# Patient Record
Sex: MALE | Race: WHITE | Employment: OTHER
[De-identification: names, ages, dates, MRNs, and addresses within clinical notes are randomized per-mention and may not be internally consistent; named-entity substitution may affect disease eponyms.]

---

## 2017-02-01 ENCOUNTER — OFFICE VISIT (OUTPATIENT)
Dept: FAMILY MEDICINE CLINIC | Facility: CLINIC | Age: 72
End: 2017-02-01

## 2017-02-01 VITALS
RESPIRATION RATE: 20 BRPM | WEIGHT: 232 LBS | HEART RATE: 96 BPM | DIASTOLIC BLOOD PRESSURE: 80 MMHG | SYSTOLIC BLOOD PRESSURE: 138 MMHG | BODY MASS INDEX: 33.29 KG/M2 | TEMPERATURE: 97.4 F | OXYGEN SATURATION: 97 %

## 2017-02-01 DIAGNOSIS — E11.9 TYPE 2 DIABETES MELLITUS WITHOUT COMPLICATION, WITHOUT LONG-TERM CURRENT USE OF INSULIN (HCC): Primary | ICD-10-CM

## 2017-02-01 PROCEDURE — G8427 DOCREV CUR MEDS BY ELIG CLIN: HCPCS | Performed by: NURSE PRACTITIONER

## 2017-02-01 PROCEDURE — G8419 CALC BMI OUT NRM PARAM NOF/U: HCPCS | Performed by: NURSE PRACTITIONER

## 2017-02-01 PROCEDURE — 1036F TOBACCO NON-USER: CPT | Performed by: NURSE PRACTITIONER

## 2017-02-01 PROCEDURE — G8484 FLU IMMUNIZE NO ADMIN: HCPCS | Performed by: NURSE PRACTITIONER

## 2017-02-01 PROCEDURE — 99213 OFFICE O/P EST LOW 20 MIN: CPT | Performed by: NURSE PRACTITIONER

## 2017-02-01 PROCEDURE — 3017F COLORECTAL CA SCREEN DOC REV: CPT | Performed by: NURSE PRACTITIONER

## 2017-02-01 PROCEDURE — 1123F ACP DISCUSS/DSCN MKR DOCD: CPT | Performed by: NURSE PRACTITIONER

## 2017-02-01 PROCEDURE — 3044F HG A1C LEVEL LT 7.0%: CPT | Performed by: NURSE PRACTITIONER

## 2017-02-01 PROCEDURE — 4040F PNEUMOC VAC/ADMIN/RCVD: CPT | Performed by: NURSE PRACTITIONER

## 2017-02-01 ASSESSMENT — PATIENT HEALTH QUESTIONNAIRE - PHQ9
SUM OF ALL RESPONSES TO PHQ QUESTIONS 1-9: 0
SUM OF ALL RESPONSES TO PHQ9 QUESTIONS 1 & 2: 0
2. FEELING DOWN, DEPRESSED OR HOPELESS: 0
1. LITTLE INTEREST OR PLEASURE IN DOING THINGS: 0

## 2017-02-01 ASSESSMENT — ENCOUNTER SYMPTOMS
NAUSEA: 0
SINUS PRESSURE: 0
VISUAL CHANGE: 0
WHEEZING: 0
CONSTIPATION: 0
ABDOMINAL DISTENTION: 0
EYE ITCHING: 0
SHORTNESS OF BREATH: 0
EYE PAIN: 0
ABDOMINAL PAIN: 0
COLOR CHANGE: 0
DIARRHEA: 0
CHEST TIGHTNESS: 0
BLOOD IN STOOL: 0
COUGH: 0
SORE THROAT: 0

## 2017-03-24 RX ORDER — METOPROLOL SUCCINATE 50 MG/1
50 TABLET, EXTENDED RELEASE ORAL DAILY
Qty: 90 TABLET | Refills: 1 | Status: SHIPPED | OUTPATIENT
Start: 2017-03-24 | End: 2017-09-28 | Stop reason: SDUPTHER

## 2017-04-17 RX ORDER — ALLOPURINOL 300 MG/1
TABLET ORAL
Qty: 90 TABLET | Refills: 0 | Status: SHIPPED | OUTPATIENT
Start: 2017-04-17 | End: 2017-06-27 | Stop reason: SDUPTHER

## 2017-04-17 RX ORDER — ALLOPURINOL 300 MG/1
TABLET ORAL
Qty: 90 TABLET | Refills: 0 | Status: SHIPPED | OUTPATIENT
Start: 2017-04-17 | End: 2017-04-17 | Stop reason: SDUPTHER

## 2017-04-26 ENCOUNTER — NURSE ONLY (OUTPATIENT)
Dept: FAMILY MEDICINE CLINIC | Age: 72
End: 2017-04-26
Payer: MEDICARE

## 2017-04-26 ENCOUNTER — HOSPITAL ENCOUNTER (OUTPATIENT)
Age: 72
Setting detail: SPECIMEN
Discharge: HOME | End: 2017-04-26
Payer: MEDICARE

## 2017-04-26 DIAGNOSIS — Z12.11 SCREENING FOR COLON CANCER: ICD-10-CM

## 2017-04-26 DIAGNOSIS — E11.9 TYPE 2 DIABETES MELLITUS WITHOUT COMPLICATION, UNSPECIFIED LONG TERM INSULIN USE STATUS: Primary | ICD-10-CM

## 2017-04-26 DIAGNOSIS — E78.00 ELEVATED CHOLESTEROL: ICD-10-CM

## 2017-04-26 DIAGNOSIS — M1A.9XX0 CHRONIC GOUT WITHOUT TOPHUS, UNSPECIFIED CAUSE, UNSPECIFIED SITE: ICD-10-CM

## 2017-04-26 LAB — HBA1C MFR BLD: 6.8 %

## 2017-04-26 PROCEDURE — 83036 HEMOGLOBIN GLYCOSYLATED A1C: CPT | Performed by: NURSE PRACTITIONER

## 2017-04-27 LAB
CREATININE URINE: 159 MG/DL (ref 39–259)
MICROALBUMIN/CREAT 24H UR: 87 MG/L
MICROALBUMIN/CREAT UR-RTO: 55 MCG/MG CREAT

## 2017-06-27 RX ORDER — ALLOPURINOL 300 MG/1
TABLET ORAL
Qty: 90 TABLET | Refills: 0 | Status: SHIPPED | OUTPATIENT
Start: 2017-06-27 | End: 2017-10-11 | Stop reason: SDUPTHER

## 2017-06-30 RX ORDER — ROSUVASTATIN CALCIUM 5 MG/1
5 TABLET, COATED ORAL
Qty: 30 TABLET | Refills: 3 | Status: SHIPPED | OUTPATIENT
Start: 2017-06-30 | End: 2017-07-05 | Stop reason: SDUPTHER

## 2017-07-05 RX ORDER — ROSUVASTATIN CALCIUM 5 MG/1
5 TABLET, COATED ORAL
Qty: 30 TABLET | Refills: 3 | OUTPATIENT
Start: 2017-07-05 | End: 2018-08-20 | Stop reason: SDUPTHER

## 2017-07-21 ENCOUNTER — HOSPITAL ENCOUNTER (OUTPATIENT)
Age: 72
Setting detail: SPECIMEN
Discharge: HOME | End: 2017-07-21
Payer: MEDICARE

## 2017-07-21 DIAGNOSIS — E11.9 TYPE 2 DIABETES MELLITUS WITHOUT COMPLICATION, UNSPECIFIED LONG TERM INSULIN USE STATUS: ICD-10-CM

## 2017-07-21 DIAGNOSIS — M1A.9XX0 CHRONIC GOUT WITHOUT TOPHUS, UNSPECIFIED CAUSE, UNSPECIFIED SITE: ICD-10-CM

## 2017-07-21 DIAGNOSIS — E78.00 ELEVATED CHOLESTEROL: ICD-10-CM

## 2017-07-21 LAB
ALT SERPL-CCNC: 25 U/L (ref 5–41)
AST SERPL-CCNC: 21 U/L
CHOLESTEROL/HDL RATIO: 3.8
CHOLESTEROL: 119 MG/DL
HDLC SERPL-MCNC: 31 MG/DL
LDL CHOLESTEROL: 56 MG/DL (ref 0–130)
TRIGL SERPL-MCNC: 161 MG/DL
URIC ACID: 6.1 MG/DL (ref 3.4–7)
VLDLC SERPL CALC-MCNC: ABNORMAL MG/DL (ref 1–30)

## 2017-07-24 DIAGNOSIS — E11.9 TYPE 2 DIABETES MELLITUS WITHOUT COMPLICATION, WITHOUT LONG-TERM CURRENT USE OF INSULIN (HCC): ICD-10-CM

## 2017-07-31 ENCOUNTER — HOSPITAL ENCOUNTER (OUTPATIENT)
Age: 72
Setting detail: SPECIMEN
Discharge: HOME | End: 2017-07-31
Payer: MEDICARE

## 2017-07-31 LAB
BUN BLDV-MCNC: 21 MG/DL (ref 8–23)
CHOLESTEROL/HDL RATIO: 4.3
CHOLESTEROL: 130 MG/DL
CREAT SERPL-MCNC: 1.11 MG/DL (ref 0.7–1.2)
ESTIMATED AVERAGE GLUCOSE: 154 MG/DL
GFR AFRICAN AMERICAN: >60 ML/MIN
GFR NON-AFRICAN AMERICAN: >60 ML/MIN
GFR SERPL CREATININE-BSD FRML MDRD: NORMAL ML/MIN/{1.73_M2}
GFR SERPL CREATININE-BSD FRML MDRD: NORMAL ML/MIN/{1.73_M2}
HBA1C MFR BLD: 7 % (ref 4–6)
HCT VFR BLD CALC: 42 % (ref 41–53)
HDLC SERPL-MCNC: 30 MG/DL
HEMOGLOBIN: 14.3 G/DL (ref 13.5–17.5)
LDL CHOLESTEROL: 57 MG/DL (ref 0–130)
MCH RBC QN AUTO: 31.5 PG (ref 26–34)
MCHC RBC AUTO-ENTMCNC: 34.1 G/DL (ref 31–37)
MCV RBC AUTO: 92.3 FL (ref 80–100)
PDW BLD-RTO: 14.7 % (ref 12.5–15.4)
PLATELET # BLD: 261 K/UL (ref 140–450)
PMV BLD AUTO: 8.9 FL (ref 6–12)
RBC # BLD: 4.55 M/UL (ref 4.5–5.9)
TRIGL SERPL-MCNC: 216 MG/DL
VLDLC SERPL CALC-MCNC: ABNORMAL MG/DL (ref 1–30)
WBC # BLD: 10.4 K/UL (ref 3.5–11)

## 2017-08-14 ENCOUNTER — HOSPITAL ENCOUNTER (OUTPATIENT)
Dept: MRI IMAGING | Age: 72
Discharge: HOME | End: 2017-08-14
Payer: MEDICARE

## 2017-08-14 ENCOUNTER — HOSPITAL ENCOUNTER (OUTPATIENT)
Dept: CT IMAGING | Age: 72
Discharge: HOME | End: 2017-08-14
Payer: MEDICARE

## 2017-08-14 DIAGNOSIS — I63.89 OTHER CEREBRAL INFARCTION (HCC): ICD-10-CM

## 2017-08-14 DIAGNOSIS — R44.1 VISUAL HALLUCINATIONS: ICD-10-CM

## 2017-08-14 PROCEDURE — A9579 GAD-BASE MR CONTRAST NOS,1ML: HCPCS | Performed by: OPHTHALMOLOGY

## 2017-08-14 PROCEDURE — 6360000004 HC RX CONTRAST MEDICATION: Performed by: OPHTHALMOLOGY

## 2017-08-14 PROCEDURE — 2580000003 HC RX 258: Performed by: OPHTHALMOLOGY

## 2017-08-14 PROCEDURE — 70498 CT ANGIOGRAPHY NECK: CPT

## 2017-08-14 PROCEDURE — 70496 CT ANGIOGRAPHY HEAD: CPT

## 2017-08-14 PROCEDURE — 70553 MRI BRAIN STEM W/O & W/DYE: CPT

## 2017-08-14 RX ORDER — 0.9 % SODIUM CHLORIDE 0.9 %
100 INTRAVENOUS SOLUTION INTRAVENOUS ONCE
Status: COMPLETED | OUTPATIENT
Start: 2017-08-14 | End: 2017-08-14

## 2017-08-14 RX ORDER — SODIUM CHLORIDE 0.9 % (FLUSH) 0.9 %
10 SYRINGE (ML) INJECTION PRN
Status: DISCONTINUED | OUTPATIENT
Start: 2017-08-14 | End: 2017-08-17 | Stop reason: HOSPADM

## 2017-08-14 RX ADMIN — GADOPENTETATE DIMEGLUMINE 20 ML: 469.01 INJECTION INTRAVENOUS at 16:52

## 2017-08-14 RX ADMIN — IOVERSOL 100 ML: 741 INJECTION INTRA-ARTERIAL; INTRAVENOUS at 15:45

## 2017-08-14 RX ADMIN — Medication 10 ML: at 15:45

## 2017-08-14 RX ADMIN — SODIUM CHLORIDE 100 ML: 9 INJECTION, SOLUTION INTRAVENOUS at 15:45

## 2017-09-11 RX ORDER — VALSARTAN AND HYDROCHLOROTHIAZIDE 80; 12.5 MG/1; MG/1
1 TABLET, FILM COATED ORAL DAILY
Qty: 90 TABLET | Refills: 0 | Status: SHIPPED | OUTPATIENT
Start: 2017-09-11 | End: 2017-10-11 | Stop reason: SDUPTHER

## 2017-09-28 RX ORDER — METOPROLOL SUCCINATE 50 MG/1
50 TABLET, EXTENDED RELEASE ORAL DAILY
Qty: 30 TABLET | Refills: 0 | Status: SHIPPED | OUTPATIENT
Start: 2017-09-28 | End: 2017-10-11 | Stop reason: SDUPTHER

## 2017-10-04 ENCOUNTER — HOSPITAL ENCOUNTER (OUTPATIENT)
Age: 72
Setting detail: SPECIMEN
Discharge: HOME | End: 2017-10-04
Payer: MEDICARE

## 2017-10-04 LAB
ABSOLUTE EOS #: 0.3 K/UL (ref 0–0.4)
ABSOLUTE LYMPH #: 2.6 K/UL (ref 1–4.8)
ABSOLUTE MONO #: 1.1 K/UL (ref 0.1–1.2)
ALBUMIN SERPL-MCNC: 4.1 G/DL (ref 3.5–5.2)
ALBUMIN/GLOBULIN RATIO: 1.5 (ref 1–2.5)
ALP BLD-CCNC: 44 U/L (ref 40–129)
ALT SERPL-CCNC: 32 U/L (ref 5–41)
AMYLASE: 87 U/L (ref 28–100)
AST SERPL-CCNC: 23 U/L
BASOPHILS # BLD: 0 %
BASOPHILS ABSOLUTE: 0 K/UL (ref 0–0.2)
BILIRUB SERPL-MCNC: 0.43 MG/DL (ref 0.3–1.2)
BILIRUBIN DIRECT: 0.12 MG/DL
BILIRUBIN, INDIRECT: 0.31 MG/DL (ref 0–1)
C-REACTIVE PROTEIN: 0.8 MG/L (ref 0–5)
DIFFERENTIAL TYPE: NORMAL
EOSINOPHILS RELATIVE PERCENT: 3 %
GLOBULIN: NORMAL G/DL (ref 1.5–3.8)
HCT VFR BLD CALC: 42.4 % (ref 41–53)
HEMOGLOBIN: 14.4 G/DL (ref 13.5–17.5)
LIPASE: 38 U/L (ref 13–60)
LYMPHOCYTES # BLD: 27 %
MCH RBC QN AUTO: 31.3 PG (ref 26–34)
MCHC RBC AUTO-ENTMCNC: 33.9 G/DL (ref 31–37)
MCV RBC AUTO: 92.4 FL (ref 80–100)
MONOCYTES # BLD: 11 %
PDW BLD-RTO: 14.8 % (ref 12.5–15.4)
PLATELET # BLD: 273 K/UL (ref 140–450)
PLATELET ESTIMATE: NORMAL
PMV BLD AUTO: 8.7 FL (ref 6–12)
RBC # BLD: 4.59 M/UL (ref 4.5–5.9)
RBC # BLD: NORMAL 10*6/UL
SEG NEUTROPHILS: 59 %
SEGMENTED NEUTROPHILS ABSOLUTE COUNT: 5.7 K/UL (ref 1.8–7.7)
TOTAL PROTEIN: 6.8 G/DL (ref 6.4–8.3)
WBC # BLD: 9.7 K/UL (ref 3.5–11)
WBC # BLD: NORMAL 10*3/UL

## 2017-10-05 LAB — SEDIMENTATION RATE, ERYTHROCYTE: 6 MM (ref 0–10)

## 2017-10-11 ENCOUNTER — OFFICE VISIT (OUTPATIENT)
Dept: FAMILY MEDICINE CLINIC | Age: 72
End: 2017-10-11
Payer: MEDICARE

## 2017-10-11 VITALS
HEIGHT: 70 IN | HEART RATE: 88 BPM | DIASTOLIC BLOOD PRESSURE: 78 MMHG | BODY MASS INDEX: 33.84 KG/M2 | WEIGHT: 236.4 LBS | TEMPERATURE: 97.7 F | SYSTOLIC BLOOD PRESSURE: 134 MMHG | OXYGEN SATURATION: 98 % | RESPIRATION RATE: 18 BRPM

## 2017-10-11 DIAGNOSIS — E78.2 MIXED HYPERLIPIDEMIA: ICD-10-CM

## 2017-10-11 DIAGNOSIS — E11.9 TYPE 2 DIABETES MELLITUS WITHOUT COMPLICATION, WITHOUT LONG-TERM CURRENT USE OF INSULIN (HCC): Primary | ICD-10-CM

## 2017-10-11 DIAGNOSIS — E11.9 TYPE 2 DIABETES MELLITUS WITHOUT COMPLICATION, WITHOUT LONG-TERM CURRENT USE OF INSULIN (HCC): ICD-10-CM

## 2017-10-11 DIAGNOSIS — R56.9 SEIZURES (HCC): ICD-10-CM

## 2017-10-11 PROCEDURE — 3017F COLORECTAL CA SCREEN DOC REV: CPT | Performed by: NURSE PRACTITIONER

## 2017-10-11 PROCEDURE — 3046F HEMOGLOBIN A1C LEVEL >9.0%: CPT | Performed by: NURSE PRACTITIONER

## 2017-10-11 PROCEDURE — 4040F PNEUMOC VAC/ADMIN/RCVD: CPT | Performed by: NURSE PRACTITIONER

## 2017-10-11 PROCEDURE — G8484 FLU IMMUNIZE NO ADMIN: HCPCS | Performed by: NURSE PRACTITIONER

## 2017-10-11 PROCEDURE — G8427 DOCREV CUR MEDS BY ELIG CLIN: HCPCS | Performed by: NURSE PRACTITIONER

## 2017-10-11 PROCEDURE — 1036F TOBACCO NON-USER: CPT | Performed by: NURSE PRACTITIONER

## 2017-10-11 PROCEDURE — G8598 ASA/ANTIPLAT THER USED: HCPCS | Performed by: NURSE PRACTITIONER

## 2017-10-11 PROCEDURE — 99214 OFFICE O/P EST MOD 30 MIN: CPT | Performed by: NURSE PRACTITIONER

## 2017-10-11 PROCEDURE — G8417 CALC BMI ABV UP PARAM F/U: HCPCS | Performed by: NURSE PRACTITIONER

## 2017-10-11 PROCEDURE — 1123F ACP DISCUSS/DSCN MKR DOCD: CPT | Performed by: NURSE PRACTITIONER

## 2017-10-11 RX ORDER — VALSARTAN AND HYDROCHLOROTHIAZIDE 80; 12.5 MG/1; MG/1
1 TABLET, FILM COATED ORAL DAILY
Qty: 90 TABLET | Refills: 0 | Status: SHIPPED | OUTPATIENT
Start: 2017-10-11 | End: 2017-12-12 | Stop reason: SDUPTHER

## 2017-10-11 RX ORDER — GLUCOSAMINE HCL/CHONDROITIN SU 500-400 MG
1 CAPSULE ORAL DAILY
Qty: 50 STRIP | Refills: 5 | Status: SHIPPED | OUTPATIENT
Start: 2017-10-11 | End: 2017-10-12 | Stop reason: CLARIF

## 2017-10-11 RX ORDER — ALLOPURINOL 300 MG/1
TABLET ORAL
Qty: 90 TABLET | Refills: 1 | Status: SHIPPED | OUTPATIENT
Start: 2017-10-11 | End: 2018-04-16 | Stop reason: SDUPTHER

## 2017-10-11 RX ORDER — DIVALPROEX SODIUM 500 MG/1
500 TABLET, DELAYED RELEASE ORAL 3 TIMES DAILY
Status: ON HOLD | COMMUNITY
End: 2018-01-26 | Stop reason: ALTCHOICE

## 2017-10-11 RX ORDER — METOPROLOL SUCCINATE 50 MG/1
50 TABLET, EXTENDED RELEASE ORAL DAILY
Qty: 90 TABLET | Refills: 1 | Status: SHIPPED | OUTPATIENT
Start: 2017-10-11 | End: 2017-10-30 | Stop reason: SDUPTHER

## 2017-10-11 RX ORDER — LANCETS 28 GAUGE
1 EACH MISCELLANEOUS DAILY
Qty: 100 EACH | Refills: 3 | Status: SHIPPED | OUTPATIENT
Start: 2017-10-11 | End: 2017-10-12 | Stop reason: CLARIF

## 2017-10-11 NOTE — PROGRESS NOTES
Visit Information    Have you changed or started any medications since your last visit including any over-the-counter medicines, vitamins, or herbal medicines? no   Are you having any side effects from any of your medications? -  no  Have you stopped taking any of your medications? Is so, why? -  no    Have you seen any other physician or provider since your last visit? No  Have you had any other diagnostic tests since your last visit? No  Have you been seen in the emergency room and/or had an admission to a hospital since we last saw you? No  Have you had your routine dental cleaning in the past 6 months? yes     Have you activated your [employer] account? If not, what are your barriers?  No: declined      Patient Care Team:  Rosamaria Traylor MD as PCP - General  Sai Orlando CNP as PCP - [facility] Attributed Provider    Medical History Review      Health Maintenance   Topic Date Due    Hepatitis C screen  1945    Diabetic foot exam  09/21/1956    Colon cancer screen colonoscopy  09/21/1996    Zostavax vaccine  09/21/2006    Flu vaccine (1) 09/01/2017    Pneumococcal low/med risk (2 of 2 - PPSV23) 11/01/2017    Diabetic microalbuminuria test  04/26/2018    Diabetic retinal exam  06/05/2018    Diabetic hemoglobin A1C test  07/31/2018    Lipid screen  07/31/2018    DTaP/Tdap/Td vaccine (2 - Td) 10/07/2025

## 2017-10-12 DIAGNOSIS — E11.9 TYPE 2 DIABETES MELLITUS WITHOUT COMPLICATION, UNSPECIFIED LONG TERM INSULIN USE STATUS: Primary | ICD-10-CM

## 2017-10-12 RX ORDER — DIPHENHYDRAMINE HCL 25 MG
1 TABLET ORAL DAILY
Qty: 1 KIT | Refills: 0 | Status: ON HOLD | OUTPATIENT
Start: 2017-10-12 | End: 2018-02-01 | Stop reason: HOSPADM

## 2017-10-12 NOTE — TELEPHONE ENCOUNTER
Freestyle test strips were sent Insurance will not cover. Need to send new script orders entered.  [facility]

## 2017-10-16 ASSESSMENT — ENCOUNTER SYMPTOMS
SHORTNESS OF BREATH: 0
BLOOD IN STOOL: 0
COUGH: 0
WHEEZING: 0
EYE PAIN: 0
NAUSEA: 0
SORE THROAT: 0
SINUS PRESSURE: 0
VISUAL CHANGE: 1
EYE ITCHING: 0
CONSTIPATION: 0
DIARRHEA: 0
COLOR CHANGE: 0
ABDOMINAL DISTENTION: 0
CHEST TIGHTNESS: 0
ABDOMINAL PAIN: 0

## 2017-10-16 NOTE — PROGRESS NOTES
reduction)    PHQ Scores 2/1/2017   PHQ2 Score 0   PHQ9 Score 0     Interpretation of Total Score Depression Severity: 1-4 = Minimal depression, 5-9 = Mild depression, 10-14 = Moderate depression, 15-19 = Moderately severe depression, 20-27 = Severe depression            HPI:     Diabetes   He presents for his follow-up diabetic visit. He has type 2 diabetes mellitus. No MedicAlert identification noted. His disease course has been stable. Hypoglycemia symptoms include seizures (described as flashes in eyes that last a few seconds. ). Pertinent negatives for hypoglycemia include no headaches, nervousness/anxiousness or speech difficulty. Associated symptoms include visual change. Pertinent negatives for diabetes include no chest pain, no polydipsia, no polyphagia, no polyuria and no weakness. There are no hypoglycemic complications. There are no diabetic complications. Risk factors for coronary artery disease include dyslipidemia, hypertension, male sex and diabetes mellitus. Current diabetic treatment includes oral agent (monotherapy). He is compliant with treatment most of the time. His weight is stable. He is following a generally healthy diet. Meal planning includes avoidance of concentrated sweets. He has not had a previous visit with a dietitian. He participates in exercise intermittently. There is no change in his home blood glucose trend. An ACE inhibitor/angiotensin II receptor blocker is being taken. He does not see a podiatrist.Eye exam is not current. Hyperlipidemia   This is a chronic problem. The current episode started more than 1 year ago. The problem is controlled. Recent lipid tests were reviewed and are normal. Exacerbating diseases include diabetes. He has no history of hypothyroidism. Factors aggravating his hyperlipidemia include thiazides and beta blockers. Pertinent negatives include no chest pain, leg pain, myalgias or shortness of breath.  Current antihyperlipidemic treatment includes statins. The current treatment provides moderate improvement of lipids. There are no compliance problems. Risk factors for coronary artery disease include diabetes mellitus, hypertension, male sex and obesity. Seizures   This is a new problem. The current episode started more than 1 month ago. The problem occurs 2 to 4 times per day. The problem has been unchanged. Associated symptoms include a visual change. Pertinent negatives include no abdominal pain, arthralgias, chest pain, congestion, coughing, headaches, myalgias, nausea, rash, sore throat or weakness. The symptoms are aggravated by bending. He has tried rest and lying down for the symptoms. The treatment provided no relief. Seizures consist of eye having a quick fading out of vision and it passes within seconds. States neurologist did EEG which was abnormal and that was how this was determined to be seizures. Hemoglobin A1C (%)   Date Value   07/31/2017 7.0 (H)   04/26/2017 6.8   03/23/2016 5.4             ( goal A1C is < 7)   Microalb/Crt.  Ratio (mcg/mg creat)   Date Value   04/26/2017 55 (H)     LDL Cholesterol (mg/dL)   Date Value   07/31/2017 57   07/21/2017 56   03/23/2016 58       (goal LDL is <100)   AST (U/L)   Date Value   10/04/2017 23     ALT (U/L)   Date Value   10/04/2017 32     BUN (mg/dL)   Date Value   07/31/2017 21     BP Readings from Last 3 Encounters:   10/11/17 134/78   02/01/17 138/80   09/22/16 140/80          (goal 120/80)    Past Medical History:   Diagnosis Date    Edema     Elevated blood pressure reading without diagnosis of hypertension     Hyperlipidemia     Hypertension     Hypertrophy of prostate with urinary obstruction and other lower urinary tract symptoms (LUTS)     Impotence of organic origin     Lumbago     Spasm of muscle     Thoracic or lumbosacral neuritis or radiculitis, unspecified     Type II or unspecified type diabetes mellitus without mention of complication, not stated as uncontrolled rub.    No murmur heard. Pulmonary/Chest: Effort normal and breath sounds normal. No respiratory distress. He has no wheezes. He has no rales. He exhibits no tenderness. Abdominal: Soft. Bowel sounds are normal. He exhibits no distension. There is no splenomegaly or hepatomegaly. There is no tenderness. No hernia. Musculoskeletal: Normal range of motion. He exhibits no edema or tenderness. Lymphadenopathy:     He has no cervical adenopathy. Neurological: He is alert and oriented to person, place, and time. Coordination and gait normal.   Skin: Skin is warm and dry. No rash noted. No erythema. Psychiatric: He has a normal mood and affect. His speech is normal and behavior is normal. Judgment and thought content normal. Cognition and memory are normal.   Nursing note and vitals reviewed. Assessment:      1. Type 2 diabetes mellitus without complication, without long-term current use of insulin ([facility] [address]    2. Mixed hyperlipidemia    3. Seizures ([facility] [address]                     Plan:     Diabetes:  Monitor BS daily or more often if BS's are out of range  Discussed signs and symptoms of hypo/hyperglycemia  Take medications as directed  Read nutrition labels so appropriate carbohydrates can be counted  Lose weight slowly if needed  Monitor BP every couple of days  Exercise for at least 30 minute a day 3-5 times a week  Drink plenty of water (8-8oz glasses daily)  RTO as directed  Hyperlipidemia:  Take medication as directed  May want to take Darrick Red daily to help with good cholesterol  Limit the fat in diet to less than 300mg per day  Most of the fat intake should be from polyunsaturated or monounsaturated fats such as fish, olive oil, avocados, nuts. Exercise regularly at least 3 times a week for 30 minutes  Keep weight down  Do not smoke  RTO as directed  Seizures: Take medication as directed  Do not skip a dose. Get refills before you run out.   Keep seizure medicines in a safe place, away from children. Store medicines in a dry place, in the bottle that they came in. Do not drink alcohol or use illegal drugs as these can cause seizures. Do not drive or do any activity that would be unsafe if seizure occurred. No orders of the defined types were placed in this encounter. Orders Placed This Encounter   Medications    allopurinol (ZYLOPRIM) 300 MG tablet     Sig: TAKE ONE TABLET BY MOUTH ONCE DAILY     Dispense:  90 tablet     Refill:  1    metoprolol succinate (TOPROL XL) 50 MG extended release tablet     Sig: Take 1 tablet by mouth daily     Dispense:  90 tablet     Refill:  1    metFORMIN (GLUCOPHAGE) 500 MG tablet     Sig: Take 1 tablet by mouth daily (with breakfast)     Dispense:  90 tablet     Refill:  1    valsartan-hydrochlorothiazide (DIOVAN HCT) 80-12.5 MG per tablet     Sig: Take 1 tablet by mouth daily     Dispense:  90 tablet     Refill:  0    DISCONTD: Glucose Blood (BLOOD GLUCOSE TEST STRIPS) STRP     Sig: Inject 1 strip into the skin daily     Dispense:  50 strip     Refill:  5     Freestyle Freedom Lite strips        Return in about 6 months (around 4/11/2018) for hypertension, hyperlipidemia. Patient given educational materials - see patient instructions. Discussed use, benefit, and side effects of prescribed medications. All patient questions answered. Pt voiced understanding. Reviewed health maintenance. Instructed to continue current medications, diet and exercise. Patient agreed with treatment plan. Follow up as directed.      Electronically signed by Lindsay Krause CNP on 10/16/2017

## 2017-10-30 DIAGNOSIS — E11.9 TYPE 2 DIABETES MELLITUS WITHOUT COMPLICATION, WITHOUT LONG-TERM CURRENT USE OF INSULIN (HCC): ICD-10-CM

## 2017-10-30 RX ORDER — METOPROLOL SUCCINATE 50 MG/1
50 TABLET, EXTENDED RELEASE ORAL DAILY
Qty: 90 TABLET | Refills: 1 | Status: SHIPPED | OUTPATIENT
Start: 2017-10-30 | End: 2018-04-16 | Stop reason: SDUPTHER

## 2017-12-04 ENCOUNTER — HOSPITAL ENCOUNTER (OUTPATIENT)
Age: 72
Setting detail: SPECIMEN
Discharge: HOME | End: 2017-12-04
Payer: MEDICARE

## 2017-12-04 LAB
ABSOLUTE EOS #: 0.55 K/UL (ref 0–0.44)
ABSOLUTE IMMATURE GRANULOCYTE: 0.05 K/UL (ref 0–0.3)
ABSOLUTE LYMPH #: 3.54 K/UL (ref 1.1–3.7)
ABSOLUTE MONO #: 1.38 K/UL (ref 0.1–1.2)
ALBUMIN SERPL-MCNC: 4.2 G/DL (ref 3.5–5.2)
ALBUMIN/GLOBULIN RATIO: 1.7 (ref 1–2.5)
ALP BLD-CCNC: 38 U/L (ref 40–129)
ALT SERPL-CCNC: 24 U/L (ref 5–41)
AMYLASE: 82 U/L (ref 28–100)
AST SERPL-CCNC: 16 U/L
BASOPHILS # BLD: 0 % (ref 0–2)
BASOPHILS ABSOLUTE: 0.05 K/UL (ref 0–0.2)
BILIRUB SERPL-MCNC: 0.46 MG/DL (ref 0.3–1.2)
BILIRUBIN DIRECT: 0.13 MG/DL
BILIRUBIN, INDIRECT: 0.33 MG/DL (ref 0–1)
DIFFERENTIAL TYPE: ABNORMAL
EOSINOPHILS RELATIVE PERCENT: 5 % (ref 1–4)
GLOBULIN: ABNORMAL G/DL (ref 1.5–3.8)
HCT VFR BLD CALC: 44.5 % (ref 40.7–50.3)
HEMOGLOBIN: 14.7 G/DL (ref 13–17)
IMMATURE GRANULOCYTES: 0 %
LIPASE: 34 U/L (ref 13–60)
LYMPHOCYTES # BLD: 30 % (ref 24–43)
MCH RBC QN AUTO: 31.1 PG (ref 25.2–33.5)
MCHC RBC AUTO-ENTMCNC: 33 G/DL (ref 28.4–34.8)
MCV RBC AUTO: 94.1 FL (ref 82.6–102.9)
MONOCYTES # BLD: 12 % (ref 3–12)
PDW BLD-RTO: 13.4 % (ref 11.8–14.4)
PLATELET # BLD: 268 K/UL (ref 138–453)
PLATELET ESTIMATE: ABNORMAL
PMV BLD AUTO: 10.7 FL (ref 8.1–13.5)
RBC # BLD: 4.73 M/UL (ref 4.21–5.77)
RBC # BLD: ABNORMAL 10*6/UL
SEG NEUTROPHILS: 53 % (ref 36–65)
SEGMENTED NEUTROPHILS ABSOLUTE COUNT: 6.3 K/UL (ref 1.5–8.1)
TOTAL PROTEIN: 6.7 G/DL (ref 6.4–8.3)
WBC # BLD: 11.9 K/UL (ref 3.5–11.3)
WBC # BLD: ABNORMAL 10*3/UL

## 2017-12-06 ENCOUNTER — NURSE ONLY (OUTPATIENT)
Dept: FAMILY MEDICINE CLINIC | Age: 72
End: 2017-12-06
Payer: MEDICARE

## 2017-12-06 VITALS
WEIGHT: 236.4 LBS | HEIGHT: 70 IN | SYSTOLIC BLOOD PRESSURE: 134 MMHG | BODY MASS INDEX: 33.84 KG/M2 | DIASTOLIC BLOOD PRESSURE: 78 MMHG | RESPIRATION RATE: 18 BRPM

## 2017-12-06 DIAGNOSIS — Z12.11 COLON CANCER SCREENING: Primary | ICD-10-CM

## 2017-12-06 DIAGNOSIS — R19.5 POSITIVE FIT (FECAL IMMUNOCHEMICAL TEST): Primary | ICD-10-CM

## 2017-12-06 LAB
CONTROL: PRESENT
HEMOCCULT STL QL: NORMAL

## 2017-12-06 PROCEDURE — 82274 ASSAY TEST FOR BLOOD FECAL: CPT | Performed by: NURSE PRACTITIONER

## 2017-12-12 RX ORDER — VALSARTAN AND HYDROCHLOROTHIAZIDE 80; 12.5 MG/1; MG/1
1 TABLET, FILM COATED ORAL DAILY
Qty: 90 TABLET | Refills: 1 | Status: SHIPPED | OUTPATIENT
Start: 2017-12-12 | End: 2018-05-30 | Stop reason: SDUPTHER

## 2017-12-12 NOTE — TELEPHONE ENCOUNTER
Last seen 10/2017    Health Maintenance   Topic Date Due    Hepatitis C screen  1945    Diabetic foot exam  09/21/1956    Zostavax vaccine  09/21/2006    Flu vaccine (1) 09/01/2017    Pneumococcal low/med risk (2 of 2 - PPSV23) 11/01/2017    Diabetic microalbuminuria test  04/26/2018    Diabetic retinal exam  06/05/2018    Diabetic hemoglobin A1C test  07/31/2018    Lipid screen  07/31/2018    Colon Cancer Screen FIT/FOBT  12/06/2018    DTaP/Tdap/Td vaccine (2 - Td) 10/07/2025             (applicable per patient's age: Cancer Screenings, Depression Screening, Fall Risk Screening, Immunizations)    Hemoglobin A1C (%)   Date Value   07/31/2017 7.0 (H)   04/26/2017 6.8   03/23/2016 5.4     Microalb/Crt. Ratio (mcg/mg creat)   Date Value   04/26/2017 55 (H)     LDL Cholesterol (mg/dL)   Date Value   07/31/2017 57     AST (U/L)   Date Value   12/04/2017 16     ALT (U/L)   Date Value   12/04/2017 24     BUN (mg/dL)   Date Value   07/31/2017 21      (goal A1C is < 7)   (goal LDL is <100) need 30-50% reduction from baseline     BP Readings from Last 3 Encounters:   12/06/17 134/78   10/11/17 134/78   02/01/17 138/80    (goal /80)      All Future Testing planned in [facility]:      Next Visit Date:  No future appointments.          Patient Active Problem List:     Spasm of muscle     Lumbago     Elevated blood pressure reading without diagnosis of hypertension     Hypertrophy of prostate with urinary obstruction and other lower urinary tract symptoms (LUTS)     Thoracic or lumbosacral neuritis or radiculitis, unspecified     Impotence of organic origin     Hyperlipidemia     Edema     Hypertension     Type 2 diabetes mellitus without complication (HCC)     Chronic gout without tophus

## 2018-01-22 ENCOUNTER — HOSPITAL ENCOUNTER (OUTPATIENT)
Age: 73
Setting detail: OUTPATIENT SURGERY
Discharge: HOME | End: 2018-01-22
Attending: SURGERY | Admitting: SURGERY
Payer: MEDICARE

## 2018-01-22 VITALS
DIASTOLIC BLOOD PRESSURE: 77 MMHG | RESPIRATION RATE: 14 BRPM | TEMPERATURE: 97.5 F | SYSTOLIC BLOOD PRESSURE: 122 MMHG | HEIGHT: 70 IN | BODY MASS INDEX: 33.21 KG/M2 | HEART RATE: 88 BPM | WEIGHT: 232 LBS | OXYGEN SATURATION: 98 %

## 2018-01-22 LAB — GLUCOSE BLD-MCNC: 159 MG/DL (ref 75–110)

## 2018-01-22 PROCEDURE — 6360000002 HC RX W HCPCS: Performed by: SURGERY

## 2018-01-22 PROCEDURE — 2580000003 HC RX 258: Performed by: SURGERY

## 2018-01-22 PROCEDURE — 7100000011 HC PHASE II RECOVERY - ADDTL 15 MIN: Performed by: SURGERY

## 2018-01-22 PROCEDURE — 99152 MOD SED SAME PHYS/QHP 5/>YRS: CPT | Performed by: SURGERY

## 2018-01-22 PROCEDURE — 99153 MOD SED SAME PHYS/QHP EA: CPT | Performed by: SURGERY

## 2018-01-22 PROCEDURE — C1773 RET DEV, INSERTABLE: HCPCS | Performed by: SURGERY

## 2018-01-22 PROCEDURE — 3609010600 HC COLONOSCOPY POLYPECTOMY SNARE/COLD BIOPSY: Performed by: SURGERY

## 2018-01-22 PROCEDURE — 88305 TISSUE EXAM BY PATHOLOGIST: CPT

## 2018-01-22 PROCEDURE — 7100000010 HC PHASE II RECOVERY - FIRST 15 MIN: Performed by: SURGERY

## 2018-01-22 PROCEDURE — 82947 ASSAY GLUCOSE BLOOD QUANT: CPT

## 2018-01-22 RX ORDER — MIDAZOLAM HYDROCHLORIDE 1 MG/ML
INJECTION INTRAMUSCULAR; INTRAVENOUS PRN
Status: DISCONTINUED | OUTPATIENT
Start: 2018-01-22 | End: 2018-01-22 | Stop reason: HOSPADM

## 2018-01-22 RX ORDER — SODIUM CHLORIDE, SODIUM LACTATE, POTASSIUM CHLORIDE, CALCIUM CHLORIDE 600; 310; 30; 20 MG/100ML; MG/100ML; MG/100ML; MG/100ML
INJECTION, SOLUTION INTRAVENOUS CONTINUOUS
Status: DISCONTINUED | OUTPATIENT
Start: 2018-01-22 | End: 2018-01-22 | Stop reason: HOSPADM

## 2018-01-22 RX ORDER — PREDNISONE 5 MG/ML
20 SOLUTION ORAL DAILY
Status: ON HOLD | COMMUNITY
End: 2018-01-26 | Stop reason: ALTCHOICE

## 2018-01-22 RX ORDER — FENTANYL CITRATE 50 UG/ML
INJECTION, SOLUTION INTRAMUSCULAR; INTRAVENOUS PRN
Status: DISCONTINUED | OUTPATIENT
Start: 2018-01-22 | End: 2018-01-22 | Stop reason: HOSPADM

## 2018-01-22 RX ADMIN — SODIUM CHLORIDE, POTASSIUM CHLORIDE, SODIUM LACTATE AND CALCIUM CHLORIDE: 600; 310; 30; 20 INJECTION, SOLUTION INTRAVENOUS at 08:06

## 2018-01-22 ASSESSMENT — PAIN - FUNCTIONAL ASSESSMENT: PAIN_FUNCTIONAL_ASSESSMENT: 0-10

## 2018-01-22 ASSESSMENT — PAIN SCALES - GENERAL: PAINLEVEL_OUTOF10: 0

## 2018-01-22 NOTE — H&P
HISTORY and [facility] [address]       NAME:  Sina Marcelino  MRN: [id number]   YOB: 1945   Date: 1/22/2018   Age: 71 y.o. Gender: male       COMPLAINT AND PRESENT HISTORY:     Sina Marcelino is 71 y.o.,   male, having a colonoscopy. Pt has a hx of positive FIT  Test.  Patient has no history of GI bleeding. Patient denies any other GI symptoms. No nausea / vomiting, No diarrhea or constipation. No abdominal pains or cramping. No heartburn, no changes in the color, caliber or consistency of the stools. No fever or chills. PAST MEDICAL HISTORY     Past Medical History:   Diagnosis Date    Edema     Elevated blood pressure reading without diagnosis of hypertension     Hyperlipidemia     Hypertension     Hypertrophy of prostate with urinary obstruction and other lower urinary tract symptoms (LUTS)     Impotence of organic origin     Lumbago     Retinal detachment 01/12/2018    right eye    Spasm of muscle     Thoracic or lumbosacral neuritis or radiculitis, unspecified     Type II or unspecified type diabetes mellitus without mention of complication, not stated as uncontrolled        Pt denies any history of  stroke, heart disease, COPD, Asthma, GERD,  Cancer, Seizures,Thyroid disease, Kidney Disease, Hepatitis, TB, Psychiatric Disorders or Substance abuse. SURGICAL HISTORY       Past Surgical History:   Procedure Laterality Date    CARDIAC SURGERY      6 vessel cabg    EYE SURGERY      right retina reattachment    PROSTATECTOMY      RETINAL DETACHMENT SURGERY      UVULOPALATOPHARYGOPLASTY      WISDOM TOOTH EXTRACTION         FAMILY HISTORY     History reviewed. No pertinent family history.     SOCIAL HISTORY       Social History     Social History    Marital status:      Spouse name: N/A    Number of children: N/A    Years of education: N/A     Social History Main Topics    Smoking status: Never Smoker    Smokeless tobacco: Never Used     Hypertrophy of prostate with urinary obstruction and other lower urinary tract symptoms (LUTS)     Thoracic or lumbosacral neuritis or radiculitis, unspecified     Impotence of organic origin     Hyperlipidemia     Edema     Hypertension        ASA Classification:  Class 3 - A patient with severe systemic disease that limits activity but is not incapacitating    Mallampati Airway Assessment:  Mallampati Class II - (soft palate, fauces & uvula are visible)    I have examined this patient and reviewed the history and physical, vital signs, current medications and review of systems.           ANGIE JUAN CNP on 1/22/2018 at 8:17 AM

## 2018-01-22 NOTE — OP NOTE
PROCEDURE NOTE    DATE OF PROCEDURE: 1/22/2018    SURGEON: Zoe Blankenship MD    ASSISTANT: None    PREOPERATIVE DIAGNOSIS: Positive fit test    POSTOPERATIVE DIAGNOSIS: Ascending colon polyp ×2 removed with hot snare polypectomy technique/polyp at 80 cm removed using large core biopsy forceps/rectal polyp removed using large core biopsy forceps/sigmoid diverticulosis/no evidence of active lower GI bleed    OPERATION: Total colonoscopy to cecum with hot snare polypectomy and polypectomy with cold biopsy forceps    ANESTHESIA: Moderate Sedation    ESTIMATED BLOOD LOSS: None    COMPLICATIONS: None     SPECIMENS:  Was Obtained: Ascending colon polyps ×2 removed with hot snare polypectomy technique/polyp at 80 cm and rectal polyp removed using large core biopsy forceps    HISTORY: The patient is a 71y.o. year old male with history of above preop diagnosis. I recommended colonoscopy with possible biopsy or polypectomy and I explained the risk, benefits, expected outcome, and alternatives to the procedure. Risks included but are not limited to bleeding, infection, respiratory distress, hypotension, and perforation of the colon and possibility of missing a lesion. The patient understands and is in agreement. PROCEDURE: The patient was given IV conscious sedation. The patient's SPO2 remained above 90% throughout the procedure. Digital rectal exam was normal.  The colonoscope was inserted through the anus into the rectum and advanced under direct vision to the cecum without difficulty. Terminal ileum was examined for approximately 2 inches. The prep was good.     yp  Findings:    Cecum/Ascending colon: abnormal: Ascending colon polyps ×2 removed using hot snare polypectomy technique and retrieved and sent to pathology    Transverse colon: abnormal: Polyp at 80 cm removed using large core biopsy forceps    Descending/Sigmoid colon: abnormal: Sigmoid diverticulosis    Rectum/Anus: examined in normal and retroflexed

## 2018-01-22 NOTE — PROGRESS NOTES
Post-colonoscopy documentation: 78-year-old gentleman underwent colonoscopy with polypectomy tolerated the procedure well. He was transferred to the recovery room in a stable condition. Abdomen remains benign. Discharged home when criteria are met.   Patient needs EGD as an outpatient given positive fit test.

## 2018-01-23 LAB — SURGICAL PATHOLOGY REPORT: NORMAL

## 2018-01-26 ENCOUNTER — HOSPITAL ENCOUNTER (OUTPATIENT)
Age: 73
Setting detail: OUTPATIENT SURGERY
Discharge: HOME | DRG: 920 | End: 2018-01-26
Attending: SURGERY | Admitting: SURGERY
Payer: MEDICARE

## 2018-01-26 VITALS
HEIGHT: 71 IN | DIASTOLIC BLOOD PRESSURE: 88 MMHG | BODY MASS INDEX: 32.48 KG/M2 | SYSTOLIC BLOOD PRESSURE: 142 MMHG | WEIGHT: 232 LBS | OXYGEN SATURATION: 95 % | TEMPERATURE: 97.9 F | HEART RATE: 70 BPM | RESPIRATION RATE: 16 BRPM

## 2018-01-26 LAB — GLUCOSE BLD-MCNC: 121 MG/DL (ref 75–110)

## 2018-01-26 PROCEDURE — 3609012400 HC EGD TRANSORAL BIOPSY SINGLE/MULTIPLE: Performed by: SURGERY

## 2018-01-26 PROCEDURE — 2580000003 HC RX 258: Performed by: SURGERY

## 2018-01-26 PROCEDURE — 7100000011 HC PHASE II RECOVERY - ADDTL 15 MIN: Performed by: SURGERY

## 2018-01-26 PROCEDURE — 88305 TISSUE EXAM BY PATHOLOGIST: CPT

## 2018-01-26 PROCEDURE — 6360000002 HC RX W HCPCS: Performed by: SURGERY

## 2018-01-26 PROCEDURE — 7100000010 HC PHASE II RECOVERY - FIRST 15 MIN: Performed by: SURGERY

## 2018-01-26 PROCEDURE — 99152 MOD SED SAME PHYS/QHP 5/>YRS: CPT | Performed by: SURGERY

## 2018-01-26 PROCEDURE — 82947 ASSAY GLUCOSE BLOOD QUANT: CPT

## 2018-01-26 PROCEDURE — 6370000000 HC RX 637 (ALT 250 FOR IP): Performed by: SURGERY

## 2018-01-26 RX ORDER — MIDAZOLAM HYDROCHLORIDE 1 MG/ML
INJECTION INTRAMUSCULAR; INTRAVENOUS PRN
Status: DISCONTINUED | OUTPATIENT
Start: 2018-01-26 | End: 2018-01-26 | Stop reason: HOSPADM

## 2018-01-26 RX ORDER — SODIUM CHLORIDE, SODIUM LACTATE, POTASSIUM CHLORIDE, CALCIUM CHLORIDE 600; 310; 30; 20 MG/100ML; MG/100ML; MG/100ML; MG/100ML
INJECTION, SOLUTION INTRAVENOUS CONTINUOUS
Status: DISCONTINUED | OUTPATIENT
Start: 2018-01-26 | End: 2018-01-26 | Stop reason: HOSPADM

## 2018-01-26 RX ORDER — FENTANYL CITRATE 50 UG/ML
INJECTION, SOLUTION INTRAMUSCULAR; INTRAVENOUS PRN
Status: DISCONTINUED | OUTPATIENT
Start: 2018-01-26 | End: 2018-01-26 | Stop reason: HOSPADM

## 2018-01-26 RX ADMIN — LIDOCAINE HYDROCHLORIDE 15 ML: 20 SOLUTION ORAL; TOPICAL at 14:18

## 2018-01-26 RX ADMIN — SODIUM CHLORIDE, POTASSIUM CHLORIDE, SODIUM LACTATE AND CALCIUM CHLORIDE: 600; 310; 30; 20 INJECTION, SOLUTION INTRAVENOUS at 13:25

## 2018-01-26 ASSESSMENT — PAIN SCALES - GENERAL: PAINLEVEL_OUTOF10: 0

## 2018-01-26 ASSESSMENT — PAIN - FUNCTIONAL ASSESSMENT: PAIN_FUNCTIONAL_ASSESSMENT: 0-10

## 2018-01-26 NOTE — H&P (VIEW-ONLY)
BP (!) 160/85   Pulse 97   Temp 97.9 °F (36.6 °C)   Resp 18   Ht 5' 10\" (1.778 m)   Wt 232 lb (105.2 kg)   SpO2 98%   BMI 33.29 kg/m²  Body mass index is 33.29 kg/m². GENERAL APPEARANCE:   Salazar Barros is 71 y.o.,  male, mildly obese, nourished, conscious, alert. Does not appear to be distress or pain at this time. SKIN:  Warm, dry, no cyanosis or jaundice. HEAD:  Normocephalic, atraumatic, no swelling or tenderness. EYES:  Pupils equal, reactive to light. EARS:  No discharge, no marked hearing loss. NOSE:  No rhinorrhea, epistaxis or septal deformity. THROAT:  Not congested. No ulceration bleeding or discharge. NECK:  No stiffness, trachea central.  No palpable masses or L.N.                 CHEST:  Symmetrical and equal on expansion. HEART:  RRR S1 > S2. No audible murmurs or gallops. LUNGS:  Equal on expansion, normal breath sounds. No adventitious sounds. ABDOMEN:  Mildly obese. Soft on palpation. No localized tenderness. No guarding or rigidity. No palpable organomegaly. LYMPHATICS:  No palpable cervical lymphadenopathy. LOCOMOTOR, BACK AND SPINE:  No tenderness or deformities. EXTREMITIES:  Symmetrical, no pedal edema. Figueroas sign negative. No discoloration or ulcerations. NEUROLOGIC:  The patient is conscious, alert, oriented, No apparent focal sensory or motor deficits.                                                                                      PROVISIONAL DIAGNOSES / SURGERY:       Positive FIT test  COLONOSCOPY      Patient Active Problem List    Diagnosis Date Noted    Chronic gout without tophus 02/02/2016    [facility] 81/52/0840    Spasm of muscle     Lumbago     Elevated blood pressure reading without diagnosis of hypertension

## 2018-01-29 ENCOUNTER — APPOINTMENT (OUTPATIENT)
Dept: CT IMAGING | Age: 73
DRG: 920 | End: 2018-01-29
Payer: MEDICARE

## 2018-01-29 ENCOUNTER — HOSPITAL ENCOUNTER (INPATIENT)
Age: 73
LOS: 4 days | Discharge: HOME | DRG: 920 | End: 2018-02-02
Attending: EMERGENCY MEDICINE | Admitting: INTERNAL MEDICINE
Payer: MEDICARE

## 2018-01-29 DIAGNOSIS — K62.5 RECTAL BLEEDING: Primary | ICD-10-CM

## 2018-01-29 PROBLEM — K92.2 GI BLEED: Status: ACTIVE | Noted: 2018-01-29

## 2018-01-29 LAB
ABO/RH: NORMAL
ABSOLUTE EOS #: 0.3 K/UL (ref 0–0.4)
ABSOLUTE IMMATURE GRANULOCYTE: ABNORMAL K/UL (ref 0–0.3)
ABSOLUTE LYMPH #: 3.4 K/UL (ref 1–4.8)
ABSOLUTE MONO #: 1.3 K/UL (ref 0.1–1.3)
ALBUMIN SERPL-MCNC: 4.1 G/DL (ref 3.5–5.2)
ALBUMIN/GLOBULIN RATIO: NORMAL (ref 1–2.5)
ALP BLD-CCNC: 51 U/L (ref 40–129)
ALT SERPL-CCNC: 22 U/L (ref 5–41)
ANION GAP SERPL CALCULATED.3IONS-SCNC: 13 MMOL/L (ref 9–17)
ANTIBODY SCREEN: NEGATIVE
ARM BAND NUMBER: NORMAL
AST SERPL-CCNC: 17 U/L
BASOPHILS # BLD: 1 % (ref 0–2)
BASOPHILS ABSOLUTE: 0.1 K/UL (ref 0–0.2)
BILIRUB SERPL-MCNC: 0.37 MG/DL (ref 0.3–1.2)
BILIRUBIN DIRECT: 0.12 MG/DL
BILIRUBIN, INDIRECT: 0.25 MG/DL (ref 0–1)
BUN BLDV-MCNC: 22 MG/DL (ref 8–23)
BUN/CREAT BLD: ABNORMAL (ref 9–20)
CALCIUM SERPL-MCNC: 9.4 MG/DL (ref 8.6–10.4)
CHLORIDE BLD-SCNC: 99 MMOL/L (ref 98–107)
CO2: 26 MMOL/L (ref 20–31)
CREAT SERPL-MCNC: 1.18 MG/DL (ref 0.7–1.2)
DIFFERENTIAL TYPE: ABNORMAL
EKG ATRIAL RATE: 90 BPM
EKG P AXIS: 29 DEGREES
EKG P-R INTERVAL: 192 MS
EKG Q-T INTERVAL: 356 MS
EKG QRS DURATION: 80 MS
EKG QTC CALCULATION (BAZETT): 435 MS
EKG R AXIS: 14 DEGREES
EKG T AXIS: 46 DEGREES
EKG VENTRICULAR RATE: 90 BPM
EOSINOPHILS RELATIVE PERCENT: 2 % (ref 0–4)
EXPIRATION DATE: NORMAL
GFR AFRICAN AMERICAN: >60 ML/MIN
GFR NON-AFRICAN AMERICAN: >60 ML/MIN
GFR SERPL CREATININE-BSD FRML MDRD: ABNORMAL ML/MIN/{1.73_M2}
GFR SERPL CREATININE-BSD FRML MDRD: ABNORMAL ML/MIN/{1.73_M2}
GLOBULIN: NORMAL G/DL (ref 1.5–3.8)
GLUCOSE BLD-MCNC: 152 MG/DL (ref 70–99)
HCT VFR BLD CALC: 33 % (ref 41–53)
HCT VFR BLD CALC: 40.8 % (ref 41–53)
HEMOGLOBIN: 11.3 G/DL (ref 13.5–17.5)
HEMOGLOBIN: 13.8 G/DL (ref 13.5–17.5)
IMMATURE GRANULOCYTES: ABNORMAL %
INR BLD: 1
LACTIC ACID, WHOLE BLOOD: NORMAL MMOL/L (ref 0.7–2.1)
LACTIC ACID: 1.5 MMOL/L (ref 0.5–2.2)
LACTIC ACID: 2.3 MMOL/L (ref 0.5–2.2)
LIPASE: 50 U/L (ref 13–60)
LYMPHOCYTES # BLD: 25 % (ref 24–44)
MCH RBC QN AUTO: 31.8 PG (ref 26–34)
MCHC RBC AUTO-ENTMCNC: 33.8 G/DL (ref 31–37)
MCV RBC AUTO: 94.1 FL (ref 80–100)
MONOCYTES # BLD: 10 % (ref 1–7)
NRBC AUTOMATED: ABNORMAL PER 100 WBC
PARTIAL THROMBOPLASTIN TIME: 25.2 SEC (ref 23–31)
PDW BLD-RTO: 14 % (ref 11.5–14.9)
PLATELET # BLD: 302 K/UL (ref 150–450)
PLATELET ESTIMATE: ABNORMAL
PMV BLD AUTO: 8.3 FL (ref 6–12)
POTASSIUM SERPL-SCNC: 4.1 MMOL/L (ref 3.7–5.3)
PROTHROMBIN TIME: 10.6 SEC (ref 9.7–12)
RBC # BLD: 4.34 M/UL (ref 4.5–5.9)
RBC # BLD: ABNORMAL 10*6/UL
SEG NEUTROPHILS: 62 % (ref 36–66)
SEGMENTED NEUTROPHILS ABSOLUTE COUNT: 8.5 K/UL (ref 1.3–9.1)
SODIUM BLD-SCNC: 138 MMOL/L (ref 135–144)
TOTAL PROTEIN: 7.3 G/DL (ref 6.4–8.3)
TROPONIN INTERP: NORMAL
TROPONIN INTERP: NORMAL
TROPONIN T: <0.03 NG/ML
TROPONIN T: <0.03 NG/ML
WBC # BLD: 13.7 K/UL (ref 3.5–11)
WBC # BLD: ABNORMAL 10*3/UL

## 2018-01-29 PROCEDURE — 36415 COLL VENOUS BLD VENIPUNCTURE: CPT

## 2018-01-29 PROCEDURE — 93005 ELECTROCARDIOGRAM TRACING: CPT

## 2018-01-29 PROCEDURE — 83605 ASSAY OF LACTIC ACID: CPT

## 2018-01-29 PROCEDURE — 6360000004 HC RX CONTRAST MEDICATION: Performed by: EMERGENCY MEDICINE

## 2018-01-29 PROCEDURE — 85014 HEMATOCRIT: CPT

## 2018-01-29 PROCEDURE — 86901 BLOOD TYPING SEROLOGIC RH(D): CPT

## 2018-01-29 PROCEDURE — 74177 CT ABD & PELVIS W/CONTRAST: CPT

## 2018-01-29 PROCEDURE — 84484 ASSAY OF TROPONIN QUANT: CPT

## 2018-01-29 PROCEDURE — 99285 EMERGENCY DEPT VISIT HI MDM: CPT

## 2018-01-29 PROCEDURE — 85018 HEMOGLOBIN: CPT

## 2018-01-29 PROCEDURE — 80076 HEPATIC FUNCTION PANEL: CPT

## 2018-01-29 PROCEDURE — 85025 COMPLETE CBC W/AUTO DIFF WBC: CPT

## 2018-01-29 PROCEDURE — 85610 PROTHROMBIN TIME: CPT

## 2018-01-29 PROCEDURE — 80048 BASIC METABOLIC PNL TOTAL CA: CPT

## 2018-01-29 PROCEDURE — 86900 BLOOD TYPING SEROLOGIC ABO: CPT

## 2018-01-29 PROCEDURE — 99223 1ST HOSP IP/OBS HIGH 75: CPT | Performed by: INTERNAL MEDICINE

## 2018-01-29 PROCEDURE — 85730 THROMBOPLASTIN TIME PARTIAL: CPT

## 2018-01-29 PROCEDURE — 2580000003 HC RX 258: Performed by: EMERGENCY MEDICINE

## 2018-01-29 PROCEDURE — 86850 RBC ANTIBODY SCREEN: CPT

## 2018-01-29 PROCEDURE — 2060000000 HC ICU INTERMEDIATE R&B

## 2018-01-29 PROCEDURE — 83690 ASSAY OF LIPASE: CPT

## 2018-01-29 RX ORDER — NICOTINE POLACRILEX 4 MG
15 LOZENGE BUCCAL PRN
Status: DISCONTINUED | OUTPATIENT
Start: 2018-01-29 | End: 2018-02-02 | Stop reason: HOSPADM

## 2018-01-29 RX ORDER — DEXTROSE MONOHYDRATE 50 MG/ML
100 INJECTION, SOLUTION INTRAVENOUS PRN
Status: DISCONTINUED | OUTPATIENT
Start: 2018-01-29 | End: 2018-02-02 | Stop reason: HOSPADM

## 2018-01-29 RX ORDER — 0.9 % SODIUM CHLORIDE 0.9 %
100 INTRAVENOUS SOLUTION INTRAVENOUS ONCE
Status: COMPLETED | OUTPATIENT
Start: 2018-01-29 | End: 2018-01-29

## 2018-01-29 RX ORDER — MORPHINE SULFATE 2 MG/ML
4 INJECTION, SOLUTION INTRAMUSCULAR; INTRAVENOUS
Status: DISCONTINUED | OUTPATIENT
Start: 2018-01-29 | End: 2018-02-02 | Stop reason: HOSPADM

## 2018-01-29 RX ORDER — SODIUM CHLORIDE 0.9 % (FLUSH) 0.9 %
10 SYRINGE (ML) INJECTION EVERY 12 HOURS SCHEDULED
Status: DISCONTINUED | OUTPATIENT
Start: 2018-01-30 | End: 2018-02-02 | Stop reason: HOSPADM

## 2018-01-29 RX ORDER — SODIUM CHLORIDE 9 MG/ML
INJECTION, SOLUTION INTRAVENOUS CONTINUOUS
Status: DISCONTINUED | OUTPATIENT
Start: 2018-01-30 | End: 2018-02-01

## 2018-01-29 RX ORDER — ACETAMINOPHEN 325 MG/1
650 TABLET ORAL EVERY 4 HOURS PRN
Status: DISCONTINUED | OUTPATIENT
Start: 2018-01-29 | End: 2018-02-02 | Stop reason: HOSPADM

## 2018-01-29 RX ORDER — METOPROLOL SUCCINATE 50 MG/1
50 TABLET, EXTENDED RELEASE ORAL DAILY
Status: DISCONTINUED | OUTPATIENT
Start: 2018-01-30 | End: 2018-02-02 | Stop reason: HOSPADM

## 2018-01-29 RX ORDER — SODIUM CHLORIDE 0.9 % (FLUSH) 0.9 %
10 SYRINGE (ML) INJECTION PRN
Status: DISCONTINUED | OUTPATIENT
Start: 2018-01-29 | End: 2018-02-02 | Stop reason: HOSPADM

## 2018-01-29 RX ORDER — ATORVASTATIN CALCIUM 20 MG/1
20 TABLET, FILM COATED ORAL NIGHTLY
Status: DISCONTINUED | OUTPATIENT
Start: 2018-01-30 | End: 2018-02-02 | Stop reason: HOSPADM

## 2018-01-29 RX ORDER — MORPHINE SULFATE 2 MG/ML
2 INJECTION, SOLUTION INTRAMUSCULAR; INTRAVENOUS
Status: DISCONTINUED | OUTPATIENT
Start: 2018-01-29 | End: 2018-02-02 | Stop reason: HOSPADM

## 2018-01-29 RX ORDER — ONDANSETRON 2 MG/ML
4 INJECTION INTRAMUSCULAR; INTRAVENOUS EVERY 6 HOURS PRN
Status: DISCONTINUED | OUTPATIENT
Start: 2018-01-29 | End: 2018-02-02 | Stop reason: HOSPADM

## 2018-01-29 RX ORDER — 0.9 % SODIUM CHLORIDE 0.9 %
1000 INTRAVENOUS SOLUTION INTRAVENOUS ONCE
Status: COMPLETED | OUTPATIENT
Start: 2018-01-29 | End: 2018-01-29

## 2018-01-29 RX ORDER — VALSARTAN AND HYDROCHLOROTHIAZIDE 80; 12.5 MG/1; MG/1
1 TABLET, FILM COATED ORAL DAILY
Status: DISCONTINUED | OUTPATIENT
Start: 2018-01-30 | End: 2018-01-30 | Stop reason: CLARIF

## 2018-01-29 RX ORDER — DEXTROSE MONOHYDRATE 25 G/50ML
12.5 INJECTION, SOLUTION INTRAVENOUS PRN
Status: DISCONTINUED | OUTPATIENT
Start: 2018-01-29 | End: 2018-02-02 | Stop reason: HOSPADM

## 2018-01-29 RX ADMIN — IOPAMIDOL 100 ML: 755 INJECTION, SOLUTION INTRAVENOUS at 20:50

## 2018-01-29 RX ADMIN — SODIUM CHLORIDE 1000 ML: 9 INJECTION, SOLUTION INTRAVENOUS at 21:09

## 2018-01-29 RX ADMIN — Medication 10 ML: at 20:50

## 2018-01-29 RX ADMIN — SODIUM CHLORIDE 1000 ML: 9 INJECTION, SOLUTION INTRAVENOUS at 22:30

## 2018-01-29 RX ADMIN — SODIUM CHLORIDE 100 ML: 9 INJECTION, SOLUTION INTRAVENOUS at 20:50

## 2018-01-29 ASSESSMENT — ENCOUNTER SYMPTOMS
RHINORRHEA: 0
EYE DISCHARGE: 0
BLOOD IN STOOL: 1
NAUSEA: 0
EYE REDNESS: 0
COLOR CHANGE: 0
VOMITING: 0
COUGH: 0
SHORTNESS OF BREATH: 0
SORE THROAT: 0
DIARRHEA: 0

## 2018-01-29 NOTE — Clinical Note
Patient Class: Inpatient [101]   REQUIRED: Diagnosis: GI bleed [039128]   Estimated Length of Stay: Estimated stay of more than 2 midnights   Future Attending Provider: Leidy Higgnis [[id number]]   Telemetry Bed Required?: No
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
## 2018-01-30 LAB
ANION GAP SERPL CALCULATED.3IONS-SCNC: 11 MMOL/L (ref 9–17)
BUN BLDV-MCNC: 24 MG/DL (ref 8–23)
BUN/CREAT BLD: ABNORMAL (ref 9–20)
CALCIUM SERPL-MCNC: 8.1 MG/DL (ref 8.6–10.4)
CHLORIDE BLD-SCNC: 104 MMOL/L (ref 98–107)
CO2: 24 MMOL/L (ref 20–31)
CREAT SERPL-MCNC: 0.99 MG/DL (ref 0.7–1.2)
GFR AFRICAN AMERICAN: >60 ML/MIN
GFR NON-AFRICAN AMERICAN: >60 ML/MIN
GFR SERPL CREATININE-BSD FRML MDRD: ABNORMAL ML/MIN/{1.73_M2}
GFR SERPL CREATININE-BSD FRML MDRD: ABNORMAL ML/MIN/{1.73_M2}
GLUCOSE BLD-MCNC: 113 MG/DL (ref 75–110)
GLUCOSE BLD-MCNC: 144 MG/DL (ref 75–110)
GLUCOSE BLD-MCNC: 148 MG/DL (ref 75–110)
GLUCOSE BLD-MCNC: 158 MG/DL (ref 75–110)
GLUCOSE BLD-MCNC: 161 MG/DL (ref 75–110)
GLUCOSE BLD-MCNC: 174 MG/DL (ref 70–99)
GLUCOSE BLD-MCNC: 187 MG/DL (ref 75–110)
HCT VFR BLD CALC: 29.3 % (ref 41–53)
HCT VFR BLD CALC: 30.8 % (ref 41–53)
HEMOGLOBIN: 10.4 G/DL (ref 13.5–17.5)
HEMOGLOBIN: 9 G/DL (ref 13.5–17.5)
HEMOGLOBIN: 9.2 G/DL (ref 13.5–17.5)
HEMOGLOBIN: 9.6 G/DL (ref 13.5–17.5)
INR BLD: 1.1
MCH RBC QN AUTO: 31 PG (ref 26–34)
MCHC RBC AUTO-ENTMCNC: 32.9 G/DL (ref 31–37)
MCV RBC AUTO: 94.1 FL (ref 80–100)
NRBC AUTOMATED: ABNORMAL PER 100 WBC
PDW BLD-RTO: 14.1 % (ref 11.5–14.9)
PLATELET # BLD: 229 K/UL (ref 150–450)
PMV BLD AUTO: 8.3 FL (ref 6–12)
POTASSIUM SERPL-SCNC: 4.6 MMOL/L (ref 3.7–5.3)
PROTHROMBIN TIME: 11.4 SEC (ref 9.7–12)
RBC # BLD: 3.11 M/UL (ref 4.5–5.9)
SODIUM BLD-SCNC: 139 MMOL/L (ref 135–144)
SURGICAL PATHOLOGY REPORT: NORMAL
WBC # BLD: 13.1 K/UL (ref 3.5–11)

## 2018-01-30 PROCEDURE — 6370000000 HC RX 637 (ALT 250 FOR IP): Performed by: NURSE PRACTITIONER

## 2018-01-30 PROCEDURE — 82947 ASSAY GLUCOSE BLOOD QUANT: CPT

## 2018-01-30 PROCEDURE — 2060000000 HC ICU INTERMEDIATE R&B

## 2018-01-30 PROCEDURE — 2580000003 HC RX 258: Performed by: NURSE PRACTITIONER

## 2018-01-30 PROCEDURE — 85018 HEMOGLOBIN: CPT

## 2018-01-30 PROCEDURE — 85014 HEMATOCRIT: CPT

## 2018-01-30 PROCEDURE — 80048 BASIC METABOLIC PNL TOTAL CA: CPT

## 2018-01-30 PROCEDURE — 6360000002 HC RX W HCPCS: Performed by: NURSE PRACTITIONER

## 2018-01-30 PROCEDURE — C9113 INJ PANTOPRAZOLE SODIUM, VIA: HCPCS | Performed by: NURSE PRACTITIONER

## 2018-01-30 PROCEDURE — 36415 COLL VENOUS BLD VENIPUNCTURE: CPT

## 2018-01-30 PROCEDURE — 85027 COMPLETE CBC AUTOMATED: CPT

## 2018-01-30 PROCEDURE — 85610 PROTHROMBIN TIME: CPT

## 2018-01-30 RX ORDER — OMEPRAZOLE 20 MG/1
400 CAPSULE, DELAYED RELEASE ORAL DAILY
COMMUNITY
Start: 2018-01-27 | End: 2018-11-26

## 2018-01-30 RX ORDER — VALSARTAN 80 MG/1
80 TABLET ORAL DAILY
Status: DISCONTINUED | OUTPATIENT
Start: 2018-01-30 | End: 2018-02-02 | Stop reason: HOSPADM

## 2018-01-30 RX ORDER — HYDROCHLOROTHIAZIDE 12.5 MG/1
12.5 CAPSULE, GELATIN COATED ORAL DAILY
Status: DISCONTINUED | OUTPATIENT
Start: 2018-01-30 | End: 2018-02-02 | Stop reason: HOSPADM

## 2018-01-30 RX ADMIN — INSULIN LISPRO 1 UNITS: 100 INJECTION, SOLUTION INTRAVENOUS; SUBCUTANEOUS at 20:40

## 2018-01-30 RX ADMIN — SODIUM CHLORIDE 8 MG/HR: 9 INJECTION, SOLUTION INTRAVENOUS at 01:22

## 2018-01-30 RX ADMIN — SODIUM CHLORIDE 8 MG/HR: 9 INJECTION, SOLUTION INTRAVENOUS at 20:33

## 2018-01-30 RX ADMIN — SODIUM CHLORIDE: 9 INJECTION, SOLUTION INTRAVENOUS at 14:24

## 2018-01-30 RX ADMIN — Medication 10 ML: at 20:40

## 2018-01-30 RX ADMIN — LEVETIRACETAM 500 MG: 100 INJECTION, SOLUTION INTRAVENOUS at 12:46

## 2018-01-30 RX ADMIN — SODIUM CHLORIDE: 9 INJECTION, SOLUTION INTRAVENOUS at 01:22

## 2018-01-30 RX ADMIN — SODIUM CHLORIDE: 9 INJECTION, SOLUTION INTRAVENOUS at 07:53

## 2018-01-30 RX ADMIN — ATORVASTATIN CALCIUM 20 MG: 20 TABLET, FILM COATED ORAL at 20:39

## 2018-01-30 RX ADMIN — METOPROLOL SUCCINATE 50 MG: 50 TABLET, EXTENDED RELEASE ORAL at 09:30

## 2018-01-30 RX ADMIN — SODIUM CHLORIDE: 9 INJECTION, SOLUTION INTRAVENOUS at 21:41

## 2018-01-30 RX ADMIN — SODIUM CHLORIDE 8 MG/HR: 9 INJECTION, SOLUTION INTRAVENOUS at 09:54

## 2018-01-30 RX ADMIN — LEVETIRACETAM 1000 MG: 500 INJECTION, SOLUTION, CONCENTRATE INTRAVENOUS at 04:46

## 2018-01-30 ASSESSMENT — ENCOUNTER SYMPTOMS
BLOOD IN STOOL: 1
SPUTUM PRODUCTION: 0
ABDOMINAL PAIN: 1
SHORTNESS OF BREATH: 0
DIARRHEA: 1
WHEEZING: 0
CONSTIPATION: 0
NAUSEA: 0
ORTHOPNEA: 0
VOMITING: 0
SORE THROAT: 0
BLURRED VISION: 0
BACK PAIN: 0
COUGH: 0
DOUBLE VISION: 0

## 2018-01-30 NOTE — CONSULTS
[address]                   [address] [facility], [address]                                   CONSULTATION    PATIENT NAME: Radha Hernandez                    :        1945  MED REC NO:   [id number]                              ROOM:       2120  ACCOUNT NO:   [de-identified]                           ADMIT DATE: 2018  PROVIDER:     Júnior Rodriguez    CONSULT DATE:  2018    REFERRING PROVIDER:  Ivonne Campoverde NP    Thank you for asking us to see this nice man for a neurologic evaluation. He is 71years old and we are seeing him for reported seizure activity. The patient is a very good historian. His problems started in 2017. He was seeing \"fireworks\" in his visual field. He tells me he went to his  eye doctor, but nothing particularly was found. He eventually saw Dr. Radha Walker and there was an EEG, which was abnormal and he was started on  an anticonvulsant the name of which he cannot recall. In 2018, he  was seen by a retinal specialist who discovered a retinal detachment. This  was felt to be the source of his \"fireworks\" and the retinal specialist  told the patient he no longer needed an anticonvulsant, so it was stopped. Within the past week, the patient has had difficulty with his bowels. On  more than one occasion, he had bright red blood with a bowel movement and  that is what took him to the emergency room yesterday. While in the  emergency room, he became quite lightheaded. His blood pressure was found  to be 60/40 and he was placed in Trendelenburg and eventually had some sort  of loss of consciousness. The patient remembers being cold, clammy, and  very lightheaded. There was no tongue biting. No convulsive activity is  documented in the computer record. The patient was admitted to the floor  for IV fluids and gastrointestinal evaluation.   The nurse tells me that  last night the patient had a second event. He was on the commode, having a  bowel movement, and he became very lightheaded. He did not think he could  get up and he called some nurse friend, lost consciousness. There were no  witnessed convulsive activities with the second event and again there was  no tongue biting. The patient does not think he was confused following nor  is anything of that sort documented. None of this was associated with any  lateralized numbness or weakness. He was placed on Keppra 500 mg b.i.d.  prior to our arrival on the case. Upon questioning, the patient has had an  MRI of his brain last August along with a CT angiogram of the head and neck  and outpatient EEG as mentioned above. PAST MEDICAL HISTORY:  From a neurologic standpoint is as mentioned above. Additionally, he has diabetes, chronic back pain, retinal detachment,  prostatic hypertrophy, hypertension, and elevated serum lipids. PAST SURGICAL HISTORY:  He has had recent upper and lower GI endoscopy. There has been a prostatectomy. He has had cardiac bypass surgery. FAMILY HISTORY:  No family history of stroke or epilepsy that he recalls. CURRENT MEDICATIONS:  Noted including the Keppra, which was added last  night. SOCIAL HISTORY:  He does not smoke. Occasionally drinks alcohol. REVIEW OF SYSTEMS:  With the computer record of patient other than what is  mentioned above, a review of systems is negative for HEENT, cardiovascular,  pulmonary, gastrointestinal, urinary, musculoskeletal, skin, endocrine,  immunologic, and psychiatric. PHYSICAL EXAMINATION:  GENERAL:  He is awake, alert, pleasant and calm. NEUROLOGIC:  Cranial nerves II through XII, speech and orientation are all  normal.  I do not see any sign of head or tongue injury. 5/5 motor  function in arms and legs and no deficit of sensation to light touch  throughout. Trace deep tendon reflexes. No observed tremor, gross ataxia,  or nystagmus.     IMPRESSION AND PLAN:  Per the

## 2018-01-30 NOTE — PROGRESS NOTES
Pt up to toilet stated to RN that was feeling dizzy. RN attempted to get pt back to bed but pt stated felt too week. RN called for additional assistance. Pt then lost LOC and began to sway back and forth on toilet. Loss of conscienciousness/ swaying lasted 30-90 sec with RN present at all times. Physician notified, RRT called. Pt safely ambulated back to bed and pt was cleaned up.

## 2018-01-30 NOTE — PROGRESS NOTES
RRT call at approximately [phone]. According to Brant Lindsay, patient had been up to the bathroom and expelled approximately 1000 mL of bright red liquid stool. States that patient put on his call light requesting assistance back to bed. 2 RNs present to assist patient; upon standing, reported feeling dizzy and was returned to the commode. Patient's  head reportedly fell forward and patient lost consciousness for 30-90 seconds during which time staff noted to abnormal sounds/motions which resembled sneezing. Staff likened this to seizure activity. In addition, patient had an episode in ED, which his wife described as a seizure. Upon seeing patient at approximately [phone], patient was lying in bed, awake, alert, and oriented. When questioned patient about past seizure history, patient states that around November he began having episodes of flashing lights, which he described as fireworks, in his visual field. Patient states that flashing occurred in both eyes. At that time, he was sent to see Dr. Micah Shannon, neurologist, who completed an EEG and started patient on an unknown seizure medication. Patient states that episodes of flashing lights gradually decreased as seizure medicine was increased. However, patient was found to have retinal detachment in his right eye and underwent surgical repair. Patient believed that retinal detachment was probably likely the cause of his \"flashing lights\" so he stopped taking his seizure medication. In light of this information, neurology consult ordered. Patient may have had a vagal response or syncopal episode related to his dropping hemoglobin, or experiencing seizure activity. Options discussed with patient. Patient feels that he would be most comfortable resuming seizure medication in an attempt to prevent these episodes from happening.   Loading dose of Keppra 1 g IV administered early this a.m. with Keppra 500 mg IV twice a day until seen by neurologist.

## 2018-01-30 NOTE — PROGRESS NOTES
Pt arrived to floor via stretcher from ED and was transfered to bed. Vitals taken, telemetry applied. Admission and assessment complete. No distress noted. See doc flowsheet and admission navigator for details. POC and education initiated and reviewed with patient. Call light within reach, and pt educated on its use. Bed in lowest position, and locked. Side rails up x 2. Denied further questions or needs at this time. Will continue to monitor.

## 2018-01-30 NOTE — ED PROVIDER NOTES
appears well-developed and well-nourished. Non-toxic appearance. He does not appear ill. HENT:   Head: Normocephalic and atraumatic. Eyes: Conjunctivae and EOM are normal. Pupils are equal, round, and reactive to light. Neck: Trachea normal and normal range of motion. Neck supple. Cardiovascular: Normal rate, regular rhythm, S1 normal and S2 normal.    No murmur heard. Pulmonary/Chest: Effort normal and breath sounds normal. No accessory muscle usage. No respiratory distress. He exhibits no tenderness and no deformity. Abdominal: Normal appearance and bowel sounds are normal. He exhibits no distension, no abdominal bruit and no ascites. There is no tenderness. There is no rigidity, no rebound, no guarding, no tenderness at McBurney's point and negative Alvarez's sign. Genitourinary: Rectal exam shows guaiac positive stool. Genitourinary Comments: Bright red blood per rectum, no mass or hemorrhoid noted   Neurological: He is alert and oriented to person, place, and time. GCS eye subscore is 4. GCS verbal subscore is 5. GCS motor subscore is 6. Skin: Skin is warm. No rash noted. Psychiatric: He has a normal mood and affect. His speech is normal.       DIFFERENTIAL DIAGNOSIS/MDM:   71-year-old male presents with complaints of rectal bleeding, recent colonoscopy. Plan is basic labs, type and screen, coagulation studies and a CT of the abdomen and pelvis to rule out intraperitoneal process. DIAGNOSTIC RESULTS     EKG: All EKG's are interpreted by the Emergency Department Physician who either signs or Co-signs this chart in the absence of a cardiologist.          RADIOLOGY:   I directly visualized the following  images and reviewed the radiologist interpretations:  CT ABDOMEN PELVIS W IV CONTRAST   Final Result   1. No CT evidence for acute intra- abdominal process. 2. Mildly enlarged prostate. Correlation with PSA and physical exam findings   for BPH is recommended.    3. Other findings, as above.                 LABS:  Labs Reviewed   BASIC METABOLIC PANEL - Abnormal; Notable for the following:        Result Value    Glucose 152 (*)     All other components within normal limits   CBC WITH AUTO DIFFERENTIAL - Abnormal; Notable for the following:     WBC 13.7 (*)     RBC 4.34 (*)     Hematocrit 40.8 (*)     Monocytes 10 (*)     All other components within normal limits   LACTIC ACID - Abnormal; Notable for the following:     Lactic Acid 2.3 (*)     All other components within normal limits   HEMOGLOBIN AND HEMATOCRIT, BLOOD - Abnormal; Notable for the following:     Hemoglobin 11.3 (*)     Hematocrit 33.0 (*)     All other components within normal limits   BASIC METABOLIC PANEL W/ REFLEX TO MG FOR LOW K  - Abnormal; Notable for the following:     Glucose 174 (*)     BUN 24 (*)     Calcium 8.1 (*)     All other components within normal limits   CBC - Abnormal; Notable for the following:     WBC 13.1 (*)     RBC 3.11 (*)     Hemoglobin 9.6 (*)     Hematocrit 29.3 (*)     All other components within normal limits   HEMOGLOBIN - Abnormal; Notable for the following:     Hemoglobin 9.0 (*)     All other components within normal limits   HEMOGLOBIN AND HEMATOCRIT, BLOOD - Abnormal; Notable for the following:     Hemoglobin 10.4 (*)     Hematocrit 30.8 (*)     All other components within normal limits   HEMOGLOBIN - Abnormal; Notable for the following:     Hemoglobin 9.2 (*)     All other components within normal limits   HEMOGLOBIN - Abnormal; Notable for the following:     Hemoglobin 8.6 (*)     All other components within normal limits   HEMOGLOBIN - Abnormal; Notable for the following:     Hemoglobin 9.0 (*)     All other components within normal limits   BASIC METABOLIC PANEL W/ REFLEX TO MG FOR LOW K  - Abnormal; Notable for the following:     Glucose 114 (*)     Calcium 8.0 (*)     Chloride 110 (*)     All other components within normal limits   CBC - Abnormal; Notable for the following:     RBC 2.71 (*)

## 2018-01-30 NOTE — H&P
pressure reading without diagnosis of hypertension; Hyperlipidemia; Hypertension; Hypertrophy of prostate with urinary obstruction and other lower urinary tract symptoms (LUTS); Impotence of organic origin; Lumbago; Retinal detachment; Spasm of muscle; Thoracic or lumbosacral neuritis or radiculitis, unspecified; and Type II or unspecified type diabetes mellitus without mention of complication, not stated as uncontrolled. PAST SURGICAL HISTORY    Patient  has a past surgical history that includes ostatectomy; Retinal detachment surgery (Right); Uvulopalatopharygoplasty; Cardiac surgery; eye surgery; [place] tooth extraction; Colonoscopy (N/A, 1/22/2018); and Upper gastrointestinal endoscopy (N/A, 1/26/2018). FAMILY HISTORY    Patient family history is not on file. SOCIAL HISTORY    Patient  reports that he is a non-smoker but has been exposed to tobacco smoke. He has never used smokeless tobacco. He reports that he drinks about 1.2 oz of alcohol per week . He reports that he does not use drugs. HOME MEDICATIONS        Prior to Admission medications    Medication Sig Start Date End Date Taking?  Authorizing Provider   omeprazole (PRILOSEC) 20 MG delayed release capsule Take 20 mg by mouth daily 1/27/18  Yes Historical Provider, MD   valsartan-hydrochlorothiazide (DIOVAN HCT) 80-12.5 MG per tablet Take 1 tablet by mouth daily 12/12/17  Yes Devante Byrne CNP   metFORMIN (GLUCOPHAGE) 500 MG tablet Take 1 tablet by mouth daily (with breakfast) 10/30/17  Yes Devante Byrne CNP   metoprolol succinate (TOPROL XL) 50 MG extended release tablet Take 1 tablet by mouth daily 10/30/17  Yes Devante Byrne CNP   allopurinol (ZYLOPRIM) 300 MG tablet TAKE ONE TABLET BY MOUTH ONCE DAILY 10/11/17  Yes Devante Byrne CNP   rosuvastatin (CRESTOR) 5 MG tablet Take 1 tablet by mouth every 48 hours 7/5/17  Yes Megha Hernandez MD   Triprolidine-Pseudoephedrine (ANTIHISTAMINE NASAL DECONGEST PO) Take 1 tablet by mouth daily   Yes Historical Provider, MD   aspirin 325 MG tablet Take 325 mg by mouth daily. Yes Historical Provider, MD   Blood Glucose Monitoring Suppl (TRUE METRIX AIR GLUCOSE METER) w/Device KIT 1 Device by Does not apply route daily 10/12/17   Sherice Allison CNP   glucose blood VI test strips (TRUE METRIX BLOOD GLUCOSE TEST) strip 1 each by In Vitro route daily 10/12/17   Sherice Allison CNP       ALLERGIES      Review of patient's allergies indicates no known allergies. REVIEW OF SYSTEMS     Review of Systems   Constitutional: Negative for chills, diaphoresis, fever and malaise/fatigue. HENT: Negative for congestion and sore throat. Eyes: Negative for blurred vision and double vision. Respiratory: Negative for cough, sputum production, shortness of breath and wheezing. Cardiovascular: Negative for chest pain, palpitations, orthopnea and leg swelling. Gastrointestinal: Positive for abdominal pain (cramps), blood in stool and diarrhea. Negative for constipation, nausea and vomiting. Melena: Patient reports stool is black with bright red blood present. Genitourinary: Negative for dysuria, frequency and urgency. Musculoskeletal: Negative for back pain, falls and myalgias. Skin: Negative for itching and rash. Neurological: Negative for dizziness, sensory change, focal weakness, weakness and headaches. Psychiatric/Behavioral: Negative for depression and substance abuse. The patient is not nervous/anxious. PHYSICAL EXAM      BP (!) 106/54   Pulse 89   Temp 98.6 °F (37 °C) (Oral)   Resp 20   Ht 5' 11\" (1.803 m)   Wt 247 lb 12.8 oz (112.4 kg)   SpO2 98%   BMI 34.56 kg/m²  Body mass index is 34.56 kg/m². Physical Exam   Constitutional: He is oriented to person, place, and time. He appears well-developed and well-nourished. No distress. HENT:   Head: Normocephalic and atraumatic.    Mouth/Throat: Oropharynx is clear and moist.   Eyes: Conjunctivae and EOM are normal. Pupils are equal, round,

## 2018-01-30 NOTE — PLAN OF CARE
Problem: Falls - Risk of  Goal: Absence of falls  Outcome: Ongoing  Pt assessed as a fall risk this shift. Remains free from falls and accidental injury at this time. Fall precautions in place, including falling star sign and fall risk band on pt. Floor free from obstacles, and bed is locked and in lowest position. Adequate lighting provided. Pt encouraged to call before getting OOB for any need. Will continue to monitor needs during hourly rounding, and reinforce education on use of call light.

## 2018-01-31 LAB
ANION GAP SERPL CALCULATED.3IONS-SCNC: 10 MMOL/L (ref 9–17)
BUN BLDV-MCNC: 13 MG/DL (ref 8–23)
BUN/CREAT BLD: ABNORMAL (ref 9–20)
CALCIUM SERPL-MCNC: 8 MG/DL (ref 8.6–10.4)
CHLORIDE BLD-SCNC: 110 MMOL/L (ref 98–107)
CO2: 24 MMOL/L (ref 20–31)
CREAT SERPL-MCNC: 0.94 MG/DL (ref 0.7–1.2)
GFR AFRICAN AMERICAN: >60 ML/MIN
GFR NON-AFRICAN AMERICAN: >60 ML/MIN
GFR SERPL CREATININE-BSD FRML MDRD: ABNORMAL ML/MIN/{1.73_M2}
GFR SERPL CREATININE-BSD FRML MDRD: ABNORMAL ML/MIN/{1.73_M2}
GLUCOSE BLD-MCNC: 108 MG/DL (ref 75–110)
GLUCOSE BLD-MCNC: 114 MG/DL (ref 70–99)
GLUCOSE BLD-MCNC: 137 MG/DL (ref 75–110)
GLUCOSE BLD-MCNC: 165 MG/DL (ref 75–110)
GLUCOSE BLD-MCNC: 215 MG/DL (ref 75–110)
HCT VFR BLD CALC: 25.5 % (ref 41–53)
HEMOGLOBIN: 8.5 G/DL (ref 13.5–17.5)
HEMOGLOBIN: 8.6 G/DL (ref 13.5–17.5)
HEMOGLOBIN: 8.8 G/DL (ref 13.5–17.5)
HEMOGLOBIN: 9 G/DL (ref 13.5–17.5)
MCH RBC QN AUTO: 31.3 PG (ref 26–34)
MCHC RBC AUTO-ENTMCNC: 33.2 G/DL (ref 31–37)
MCV RBC AUTO: 94.2 FL (ref 80–100)
NRBC AUTOMATED: ABNORMAL PER 100 WBC
PDW BLD-RTO: 14 % (ref 11.5–14.9)
PLATELET # BLD: 208 K/UL (ref 150–450)
PMV BLD AUTO: 8 FL (ref 6–12)
POTASSIUM SERPL-SCNC: 4 MMOL/L (ref 3.7–5.3)
RBC # BLD: 2.71 M/UL (ref 4.5–5.9)
SODIUM BLD-SCNC: 144 MMOL/L (ref 135–144)
WBC # BLD: 9.3 K/UL (ref 3.5–11)

## 2018-01-31 PROCEDURE — 6360000002 HC RX W HCPCS: Performed by: NURSE PRACTITIONER

## 2018-01-31 PROCEDURE — 2580000003 HC RX 258: Performed by: NURSE PRACTITIONER

## 2018-01-31 PROCEDURE — 2060000000 HC ICU INTERMEDIATE R&B

## 2018-01-31 PROCEDURE — 99233 SBSQ HOSP IP/OBS HIGH 50: CPT | Performed by: INTERNAL MEDICINE

## 2018-01-31 PROCEDURE — 80048 BASIC METABOLIC PNL TOTAL CA: CPT

## 2018-01-31 PROCEDURE — 36415 COLL VENOUS BLD VENIPUNCTURE: CPT

## 2018-01-31 PROCEDURE — 6370000000 HC RX 637 (ALT 250 FOR IP): Performed by: NURSE PRACTITIONER

## 2018-01-31 PROCEDURE — 85018 HEMOGLOBIN: CPT

## 2018-01-31 PROCEDURE — 85027 COMPLETE CBC AUTOMATED: CPT

## 2018-01-31 PROCEDURE — C9113 INJ PANTOPRAZOLE SODIUM, VIA: HCPCS | Performed by: NURSE PRACTITIONER

## 2018-01-31 PROCEDURE — 82947 ASSAY GLUCOSE BLOOD QUANT: CPT

## 2018-01-31 RX ORDER — PANTOPRAZOLE SODIUM 40 MG/1
40 TABLET, DELAYED RELEASE ORAL
Status: DISCONTINUED | OUTPATIENT
Start: 2018-02-01 | End: 2018-02-02 | Stop reason: HOSPADM

## 2018-01-31 RX ADMIN — SODIUM CHLORIDE 8 MG/HR: 9 INJECTION, SOLUTION INTRAVENOUS at 07:29

## 2018-01-31 RX ADMIN — LEVETIRACETAM 500 MG: 100 INJECTION, SOLUTION INTRAVENOUS at 23:33

## 2018-01-31 RX ADMIN — SODIUM CHLORIDE: 9 INJECTION, SOLUTION INTRAVENOUS at 11:19

## 2018-01-31 RX ADMIN — LEVETIRACETAM 500 MG: 100 INJECTION, SOLUTION INTRAVENOUS at 00:47

## 2018-01-31 RX ADMIN — HYDROCHLOROTHIAZIDE 12.5 MG: 12.5 CAPSULE ORAL at 08:35

## 2018-01-31 RX ADMIN — ATORVASTATIN CALCIUM 20 MG: 20 TABLET, FILM COATED ORAL at 20:33

## 2018-01-31 RX ADMIN — SODIUM CHLORIDE: 9 INJECTION, SOLUTION INTRAVENOUS at 17:55

## 2018-01-31 RX ADMIN — LEVETIRACETAM 500 MG: 100 INJECTION, SOLUTION INTRAVENOUS at 12:56

## 2018-01-31 RX ADMIN — SODIUM CHLORIDE: 9 INJECTION, SOLUTION INTRAVENOUS at 04:45

## 2018-01-31 RX ADMIN — Medication 10 ML: at 20:33

## 2018-01-31 RX ADMIN — METOPROLOL SUCCINATE 50 MG: 50 TABLET, EXTENDED RELEASE ORAL at 08:35

## 2018-01-31 RX ADMIN — VALSARTAN 80 MG: 80 TABLET ORAL at 08:35

## 2018-01-31 RX ADMIN — Medication 10 ML: at 08:36

## 2018-01-31 NOTE — PROGRESS NOTES
1 tablet by mouth daily 10/30/17  Yes Dia Gonzalez CNP   allopurinol (ZYLOPRIM) 300 MG tablet TAKE ONE TABLET BY MOUTH ONCE DAILY 10/11/17  Yes Dia Gonzalez CNP   rosuvastatin (CRESTOR) 5 MG tablet Take 1 tablet by mouth every 48 hours 7/5/17  Yes Fang Baker MD   Triprolidine-Pseudoephedrine (ANTIHISTAMINE NASAL DECONGEST PO) Take 1 tablet by mouth daily   Yes Historical Provider, MD   aspirin 325 MG tablet Take 325 mg by mouth daily. Yes Historical Provider, MD   Blood Glucose Monitoring Suppl (TRUE METRIX AIR GLUCOSE METER) w/Device KIT 1 Device by Does not apply route daily 10/12/17   Dia Gonzalez CNP   glucose blood VI test strips (TRUE METRIX BLOOD GLUCOSE TEST) strip 1 each by In Vitro route daily 10/12/17   Dia Gonzalez CNP       ALLERGIES      Review of patient's allergies indicates no known allergies. SOCIAL HISTORY       reports that he is a non-smoker but has been exposed to tobacco smoke. He has never used smokeless tobacco. He reports that he drinks about 1.2 oz of alcohol per week . He reports that he does not use drugs. FAMILY HISTORY      family history is not on file. REVIEW OF SYSTEMS      · Constitutional: Negative for weight loss  · Eyes: Negative for visual changes, diplopia, scleral icterus. · ENT: Negative for Headaches, hearing loss, vertigo, mouth sores, sore throat. · Cardiovascular: Negative for lightheadedness/orthostatic symptoms ,chest pain, dyspnea on exertion, palpitations or loss of consciousness. · Respiratory: Negative for cough or wheezing, sputum production, hemoptysis, pleuritic pain. · Gastrointestinal: Negative for nausea/vomiting,  poschange in bowel habits, abdominal pain, dysphagia/appetite loss, hematemesis,  posblood in stools. · Genitourinary:Negative for change in bladder habits, dysuria, trouble voiding, hematuria. · Musculoskeletal: Negative for gait disturbance, weakness, joint complaints.   · Integumentary: Negative for rash, last 72 hours. S. Glucose:  Recent Labs      01/30/18   1638  01/30/18   2037  01/31/18   0624   POCGLU  113*  158*  108     Glycosylated hemoglobin A1C: No results for input(s): LABA1C in the last 72 hours. INR:   Recent Labs      01/30/18   0521   INR  1.1     Hepatic functions:   Recent Labs      01/29/18   2002   ALKPHOS  51   ALT  22   AST  17   PROT  7.3   BILITOT  0.37   BILIDIR  0.12   LABALBU  4.1     Pancreatic functions:  Recent Labs      01/29/18   2218   LACTA  1.5     S. Lactic Acid:   Recent Labs      01/29/18   2218   LACTA  1.5     Cardiac enzymes:No results for input(s): CKTOTAL, CKMB, CKMBINDEX, TROPONINI in the last 72 hours. BNP:No results for input(s): BNP in the last 72 hours. Lipid profile: No results for input(s): CHOL, TRIG, HDL, LDLCALC in the last 72 hours. Invalid input(s): LDL  Blood Gases: No results found for: PH, PCO2, PO2, HCO3, O2SAT  Thyroid functions: No results found for: TSH     Imaging/Diagonstics:    Ct Abdomen Pelvis W Iv Contrast    Result Date: 1/29/2018  EXAMINATION: CT OF THE ABDOMEN AND PELVIS WITH CONTRAST 1/29/2018 8:58 pm TECHNIQUE: CT of the abdomen and pelvis was performed with the administration of intravenous contrast. Multiplanar reformatted images are provided for review. Dose modulation, iterative reconstruction, and/or weight based adjustment of the mA/kV was utilized to reduce the radiation dose to as low as reasonably achievable. COMPARISON: None. HISTORY: ORDERING SYSTEM PROVIDED HISTORY: Rectal bleeding, recent colonoscopy TECHNOLOGIST PROVIDED HISTORY: IV Only Contrast Ordering Physician Provided Reason for Exam: PT C/O RECTAL BLEEDING X TONIGHT. PT STATES HE HAD 2 RECENT SURGERIES. PLEASE SEE BELOW. PT STATES HE FELL ON HIS BUTT X WEEK AGO. Acuity: Acute Type of Exam: Initial FINDINGS: Lung bases:  Visualized lung bases are well aerated without focal airspace consolidation, lung nodule or lung mass. No pleural or pericardial effusion.  Heart size

## 2018-02-01 LAB
ANION GAP SERPL CALCULATED.3IONS-SCNC: 11 MMOL/L (ref 9–17)
BUN BLDV-MCNC: 9 MG/DL (ref 8–23)
BUN/CREAT BLD: ABNORMAL (ref 9–20)
CALCIUM SERPL-MCNC: 8.3 MG/DL (ref 8.6–10.4)
CHLORIDE BLD-SCNC: 106 MMOL/L (ref 98–107)
CO2: 25 MMOL/L (ref 20–31)
CREAT SERPL-MCNC: 0.95 MG/DL (ref 0.7–1.2)
GFR AFRICAN AMERICAN: >60 ML/MIN
GFR NON-AFRICAN AMERICAN: >60 ML/MIN
GFR SERPL CREATININE-BSD FRML MDRD: ABNORMAL ML/MIN/{1.73_M2}
GFR SERPL CREATININE-BSD FRML MDRD: ABNORMAL ML/MIN/{1.73_M2}
GLUCOSE BLD-MCNC: 131 MG/DL (ref 75–110)
GLUCOSE BLD-MCNC: 132 MG/DL (ref 70–99)
GLUCOSE BLD-MCNC: 138 MG/DL (ref 75–110)
GLUCOSE BLD-MCNC: 162 MG/DL (ref 75–110)
GLUCOSE BLD-MCNC: 279 MG/DL (ref 75–110)
HCT VFR BLD CALC: 25.9 % (ref 41–53)
HEMOGLOBIN: 8.1 G/DL (ref 13.5–17.5)
HEMOGLOBIN: 8.1 G/DL (ref 13.5–17.5)
HEMOGLOBIN: 8.9 G/DL (ref 13.5–17.5)
HEMOGLOBIN: 9.1 G/DL (ref 13.5–17.5)
MCH RBC QN AUTO: 32.3 PG (ref 26–34)
MCHC RBC AUTO-ENTMCNC: 35 G/DL (ref 31–37)
MCV RBC AUTO: 92.2 FL (ref 80–100)
NRBC AUTOMATED: ABNORMAL PER 100 WBC
PDW BLD-RTO: 14.3 % (ref 11.5–14.9)
PLATELET # BLD: 210 K/UL (ref 150–450)
PMV BLD AUTO: 8.8 FL (ref 6–12)
POTASSIUM SERPL-SCNC: 3.6 MMOL/L (ref 3.7–5.3)
RBC # BLD: 2.8 M/UL (ref 4.5–5.9)
SODIUM BLD-SCNC: 142 MMOL/L (ref 135–144)
WBC # BLD: 10.7 K/UL (ref 3.5–11)

## 2018-02-01 PROCEDURE — 99232 SBSQ HOSP IP/OBS MODERATE 35: CPT | Performed by: INTERNAL MEDICINE

## 2018-02-01 PROCEDURE — G0008 ADMIN INFLUENZA VIRUS VAC: HCPCS | Performed by: INTERNAL MEDICINE

## 2018-02-01 PROCEDURE — 85018 HEMOGLOBIN: CPT

## 2018-02-01 PROCEDURE — 90686 IIV4 VACC NO PRSV 0.5 ML IM: CPT | Performed by: INTERNAL MEDICINE

## 2018-02-01 PROCEDURE — 6360000002 HC RX W HCPCS: Performed by: INTERNAL MEDICINE

## 2018-02-01 PROCEDURE — 36415 COLL VENOUS BLD VENIPUNCTURE: CPT

## 2018-02-01 PROCEDURE — 82947 ASSAY GLUCOSE BLOOD QUANT: CPT

## 2018-02-01 PROCEDURE — 1200000000 HC SEMI PRIVATE

## 2018-02-01 PROCEDURE — 6370000000 HC RX 637 (ALT 250 FOR IP): Performed by: NURSE PRACTITIONER

## 2018-02-01 PROCEDURE — 2580000003 HC RX 258: Performed by: NURSE PRACTITIONER

## 2018-02-01 PROCEDURE — 80048 BASIC METABOLIC PNL TOTAL CA: CPT

## 2018-02-01 PROCEDURE — 85027 COMPLETE CBC AUTOMATED: CPT

## 2018-02-01 PROCEDURE — 6360000002 HC RX W HCPCS: Performed by: NURSE PRACTITIONER

## 2018-02-01 PROCEDURE — 6370000000 HC RX 637 (ALT 250 FOR IP): Performed by: SURGERY

## 2018-02-01 RX ORDER — LEVETIRACETAM 500 MG/1
500 TABLET ORAL 2 TIMES DAILY
Qty: 60 TABLET | Refills: 3 | Status: SHIPPED | OUTPATIENT
Start: 2018-02-01 | End: 2018-11-26 | Stop reason: SDUPTHER

## 2018-02-01 RX ADMIN — SODIUM CHLORIDE: 9 INJECTION, SOLUTION INTRAVENOUS at 05:48

## 2018-02-01 RX ADMIN — INSULIN LISPRO 6 UNITS: 100 INJECTION, SOLUTION INTRAVENOUS; SUBCUTANEOUS at 12:34

## 2018-02-01 RX ADMIN — PANTOPRAZOLE SODIUM 40 MG: 40 TABLET, DELAYED RELEASE ORAL at 05:45

## 2018-02-01 RX ADMIN — INFLUENZA VIRUS VACCINE 0.5 ML: 15; 15; 15; 15 SUSPENSION INTRAMUSCULAR at 14:05

## 2018-02-01 RX ADMIN — Medication 10 ML: at 21:56

## 2018-02-01 RX ADMIN — METOPROLOL SUCCINATE 50 MG: 50 TABLET, EXTENDED RELEASE ORAL at 08:34

## 2018-02-01 RX ADMIN — VALSARTAN 80 MG: 80 TABLET ORAL at 08:34

## 2018-02-01 RX ADMIN — HYDROCHLOROTHIAZIDE 12.5 MG: 12.5 CAPSULE ORAL at 08:34

## 2018-02-01 RX ADMIN — LEVETIRACETAM 500 MG: 100 INJECTION, SOLUTION INTRAVENOUS at 12:35

## 2018-02-01 RX ADMIN — Medication 10 ML: at 08:35

## 2018-02-01 RX ADMIN — ATORVASTATIN CALCIUM 20 MG: 20 TABLET, FILM COATED ORAL at 21:56

## 2018-02-01 RX ADMIN — SODIUM CHLORIDE: 9 INJECTION, SOLUTION INTRAVENOUS at 00:13

## 2018-02-01 RX ADMIN — SODIUM CHLORIDE: 9 INJECTION, SOLUTION INTRAVENOUS at 12:38

## 2018-02-01 NOTE — CONSULTS
General Surgery Consult      Pt Name: Vidhya Martinez  MRN: [id number]  [place]: 1945  Date of evaluation: 2/1/2018  Primary Care Physician: Kesha Schulz CNP   Patient evaluated at the request of  Dr. Maicol Mensah  Reason for evaluation: rectal bleeding    SUBJECTIVE:   History of Chief Complaint:    Vidhya Martinez is a 71 y.o. male who presents with rectal bleeding bright red blood per rectum patient had a significant bowel movement during the night and I was not notified. Patient is known to me from his recent colonoscopy and EGD. No evidence of active lower GI bleed of upper GI bleed seen. EGD colonoscopy findings were reviewed. Patient also fell and bruised his left buttock. Currently patient is stable. Denied any bloody bowel movement all day. No hematuria. . Symptom onset has been acute for a time period of few hour(s). Severity is described as moderate to severe. Course of his symptoms over time is acute. Past Medical History   has a past medical history of Edema; Elevated blood pressure reading without diagnosis of hypertension; Hyperlipidemia; Hypertension; Hypertrophy of prostate with urinary obstruction and other lower urinary tract symptoms (LUTS); Impotence of organic origin; Lumbago; Retinal detachment; Spasm of muscle; Thoracic or lumbosacral neuritis or radiculitis, unspecified; and Type II or unspecified type diabetes mellitus without mention of complication, not stated as uncontrolled. Past Surgical History   has a past surgical history that includes ostatectomy; Retinal detachment surgery (Right); Uvulopalatopharygoplasty; Cardiac surgery; eye surgery; [place] tooth extraction; Colonoscopy (N/A, 1/22/2018); and Upper gastrointestinal endoscopy (N/A, 1/26/2018). Medications  Prior to Admission medications    Medication Sig Start Date End Date Taking?  Authorizing Provider   omeprazole (PRILOSEC) 20 MG delayed release capsule Take 20 mg by mouth daily 1/27/18  Yes Historical Provider, MD valsartan-hydrochlorothiazide (DIOVAN HCT) 80-12.5 MG per tablet Take 1 tablet by mouth daily 12/12/17  Yes Author AJ Lu   metFORMIN (GLUCOPHAGE) 500 MG tablet Take 1 tablet by mouth daily (with breakfast) 10/30/17  Yes Author Aly CNP   metoprolol succinate (TOPROL XL) 50 MG extended release tablet Take 1 tablet by mouth daily 10/30/17  Yes Author Aly CNP   allopurinol (ZYLOPRIM) 300 MG tablet TAKE ONE TABLET BY MOUTH ONCE DAILY 10/11/17  Yes Author Aly CNP   rosuvastatin (CRESTOR) 5 MG tablet Take 1 tablet by mouth every 48 hours 7/5/17  Yes Jossue Arce MD   Triprolidine-Pseudoephedrine (ANTIHISTAMINE NASAL DECONGEST PO) Take 1 tablet by mouth daily   Yes Historical Provider, MD   aspirin 325 MG tablet Take 325 mg by mouth daily. Yes Historical Provider, MD   Blood Glucose Monitoring Suppl (TRUE METRIX AIR GLUCOSE METER) w/Device KIT 1 Device by Does not apply route daily 10/12/17   Author Aly CNP   glucose blood VI test strips (TRUE METRIX BLOOD GLUCOSE TEST) strip 1 each by In Vitro route daily 10/12/17   Author AJ Lu     Allergies  has No Known Allergies. Family History  family history is not on file. Social History   reports that he is a non-smoker but has been exposed to tobacco smoke. He has never used smokeless tobacco. He reports that he drinks about 1.2 oz of alcohol per week . He reports that he does not use drugs. Review of Systems:  General Denies any fever or chills  HEENT Denies any diplopia, tinnitus or vertigo  Resp Denies any shortness of breath, cough or wheezing  Cardiac Denies any chest pain, palpitations, claudication or edema  GI bright red blood per rectum. Some crampy abdominal pain.  Denies any frequency, urgency, hesitancy or incontinence  Heme Denies bruising or bleeding easily  Endocrine see chart  Neuro Denies any focal motor or sensory deficits    OBJECTIVE:   VITALS:  height is 5' 11\" (1.803 m) and weight is 243 lb 6.2 oz (110.4 kg).  His 4.1 01/29/2018    CREATININE 0.94 01/31/2018    CALCIUM 8.0 01/31/2018    GFRAA >60 01/31/2018    GFRAA >60 06/06/2010    LABGLOM >60 01/31/2018    GLUCOSE 114 01/31/2018     Hepatic Function Panel:    Lab Results   Component Value Date    ALKPHOS 51 01/29/2018    ALT 22 01/29/2018    AST 17 01/29/2018    PROT 7.3 01/29/2018    PROT 7.3 06/06/2010    BILITOT 0.37 01/29/2018    BILIDIR 0.12 01/29/2018    IBILI 0.25 01/29/2018    LABALBU 4.1 01/29/2018     Calcium:    Lab Results   Component Value Date    CALCIUM 8.0 01/31/2018     Magnesium:    Lab Results   Component Value Date    MG 1.9 12/12/2012     Phosphorus:  No results found for: PHOS  PT/INR:    Lab Results   Component Value Date    PROTIME 11.4 01/30/2018    INR 1.1 01/30/2018     ABG:  No results found for: PHART, PH, UGG2QHJ, PCO2, PO2ART, PO2, VNL3NEB, HCO3, BEART, BE, THGBART, THB, [id number], B5HBWTTI, O2SAT  Urine Culture:  No components found for: CURINE  Blood Culture:  No components found for: CBLOOD, CFUNGUSBL  Stool Culture:  No components found for: CSTOOL    RADIOLOGY:   I have personally reviewed the following films:  Ct Abdomen Pelvis W Iv Contrast    Result Date: 1/29/2018  EXAMINATION: CT OF THE ABDOMEN AND PELVIS WITH CONTRAST 1/29/2018 8:58 pm TECHNIQUE: CT of the abdomen and pelvis was performed with the administration of intravenous contrast. Multiplanar reformatted images are provided for review. Dose modulation, iterative reconstruction, and/or weight based adjustment of the mA/kV was utilized to reduce the radiation dose to as low as reasonably achievable. COMPARISON: None. HISTORY: ORDERING SYSTEM PROVIDED HISTORY: Rectal bleeding, recent colonoscopy TECHNOLOGIST PROVIDED HISTORY: IV Only Contrast Ordering Physician Provided Reason for Exam: PT C/O RECTAL BLEEDING X TONIGHT. PT STATES HE HAD 2 RECENT SURGERIES. PLEASE SEE BELOW. PT STATES HE FELL ON HIS BUTT X WEEK AGO.  Acuity: Acute Type of Exam: Initial FINDINGS: Lung bases:

## 2018-02-01 NOTE — PLAN OF CARE
Problem: Falls - Risk of  Goal: Absence of falls  Outcome: Ongoing  The patient remained free from falls this shift, call light within reach, bed in locked and lowest position. Side rails up x2. Continue to monitor closely.

## 2018-02-02 VITALS
RESPIRATION RATE: 16 BRPM | HEIGHT: 71 IN | TEMPERATURE: 97.9 F | WEIGHT: 241.4 LBS | DIASTOLIC BLOOD PRESSURE: 84 MMHG | BODY MASS INDEX: 33.8 KG/M2 | HEART RATE: 82 BPM | OXYGEN SATURATION: 99 % | SYSTOLIC BLOOD PRESSURE: 143 MMHG

## 2018-02-02 PROBLEM — K92.1 GASTROINTESTINAL HEMORRHAGE WITH MELENA: Status: ACTIVE | Noted: 2018-01-29

## 2018-02-02 LAB
ANION GAP SERPL CALCULATED.3IONS-SCNC: 11 MMOL/L (ref 9–17)
BUN BLDV-MCNC: 8 MG/DL (ref 8–23)
BUN/CREAT BLD: ABNORMAL (ref 9–20)
CALCIUM SERPL-MCNC: 8.3 MG/DL (ref 8.6–10.4)
CHLORIDE BLD-SCNC: 107 MMOL/L (ref 98–107)
CO2: 25 MMOL/L (ref 20–31)
CREAT SERPL-MCNC: 1.03 MG/DL (ref 0.7–1.2)
GFR AFRICAN AMERICAN: >60 ML/MIN
GFR NON-AFRICAN AMERICAN: >60 ML/MIN
GFR SERPL CREATININE-BSD FRML MDRD: ABNORMAL ML/MIN/{1.73_M2}
GFR SERPL CREATININE-BSD FRML MDRD: ABNORMAL ML/MIN/{1.73_M2}
GLUCOSE BLD-MCNC: 123 MG/DL (ref 70–99)
GLUCOSE BLD-MCNC: 126 MG/DL (ref 75–110)
GLUCOSE BLD-MCNC: 183 MG/DL (ref 75–110)
HCT VFR BLD CALC: 24.9 % (ref 41–53)
HEMOGLOBIN: 8.5 G/DL (ref 13.5–17.5)
HEMOGLOBIN: 8.6 G/DL (ref 13.5–17.5)
HEMOGLOBIN: 9.1 G/DL (ref 13.5–17.5)
MCH RBC QN AUTO: 32.3 PG (ref 26–34)
MCHC RBC AUTO-ENTMCNC: 34.5 G/DL (ref 31–37)
MCV RBC AUTO: 93.8 FL (ref 80–100)
NRBC AUTOMATED: ABNORMAL PER 100 WBC
PDW BLD-RTO: 14 % (ref 11.5–14.9)
PLATELET # BLD: 220 K/UL (ref 150–450)
PMV BLD AUTO: 8.3 FL (ref 6–12)
POTASSIUM SERPL-SCNC: 3.7 MMOL/L (ref 3.7–5.3)
RBC # BLD: 2.66 M/UL (ref 4.5–5.9)
SODIUM BLD-SCNC: 143 MMOL/L (ref 135–144)
WBC # BLD: 9.5 K/UL (ref 3.5–11)

## 2018-02-02 PROCEDURE — 2580000003 HC RX 258: Performed by: NURSE PRACTITIONER

## 2018-02-02 PROCEDURE — 82947 ASSAY GLUCOSE BLOOD QUANT: CPT

## 2018-02-02 PROCEDURE — 6370000000 HC RX 637 (ALT 250 FOR IP): Performed by: SURGERY

## 2018-02-02 PROCEDURE — 36415 COLL VENOUS BLD VENIPUNCTURE: CPT

## 2018-02-02 PROCEDURE — 6360000002 HC RX W HCPCS: Performed by: NURSE PRACTITIONER

## 2018-02-02 PROCEDURE — 2580000003 HC RX 258: Performed by: INTERNAL MEDICINE

## 2018-02-02 PROCEDURE — 85027 COMPLETE CBC AUTOMATED: CPT

## 2018-02-02 PROCEDURE — 6360000002 HC RX W HCPCS: Performed by: INTERNAL MEDICINE

## 2018-02-02 PROCEDURE — 99238 HOSP IP/OBS DSCHRG MGMT 30/<: CPT | Performed by: INTERNAL MEDICINE

## 2018-02-02 PROCEDURE — 80048 BASIC METABOLIC PNL TOTAL CA: CPT

## 2018-02-02 PROCEDURE — 6370000000 HC RX 637 (ALT 250 FOR IP): Performed by: NURSE PRACTITIONER

## 2018-02-02 PROCEDURE — 85018 HEMOGLOBIN: CPT

## 2018-02-02 RX ADMIN — INSULIN LISPRO 2 UNITS: 100 INJECTION, SOLUTION INTRAVENOUS; SUBCUTANEOUS at 12:12

## 2018-02-02 RX ADMIN — IRON SUCROSE 500 MG: 20 INJECTION, SOLUTION INTRAVENOUS at 10:44

## 2018-02-02 RX ADMIN — VALSARTAN 80 MG: 80 TABLET ORAL at 08:23

## 2018-02-02 RX ADMIN — Medication 10 ML: at 08:24

## 2018-02-02 RX ADMIN — HYDROCHLOROTHIAZIDE 12.5 MG: 12.5 CAPSULE ORAL at 08:24

## 2018-02-02 RX ADMIN — LEVETIRACETAM 500 MG: 100 INJECTION, SOLUTION INTRAVENOUS at 15:42

## 2018-02-02 RX ADMIN — LEVETIRACETAM 500 MG: 100 INJECTION, SOLUTION INTRAVENOUS at 01:23

## 2018-02-02 RX ADMIN — METOPROLOL SUCCINATE 50 MG: 50 TABLET, EXTENDED RELEASE ORAL at 08:23

## 2018-02-02 RX ADMIN — PANTOPRAZOLE SODIUM 40 MG: 40 TABLET, DELAYED RELEASE ORAL at 05:18

## 2018-02-02 NOTE — PROGRESS NOTES
[facility].    Patient name:  Tawny Amado  Date of admission:  1/29/2018  7:34 PM  MRN:   [id number]  YOB: 1945    CC- post polypectomy bleed      HPI-  Today had 2 bowel movements dark colored  Drop in hb to 8.1    REVIEW OF SYSTEMS:    · General----negative for fatigue, weight loss  · GI negative for nausea and vomiting, no dysphagia       EXAM-  BP (!) 171/73   Pulse 76   Temp 97.9 °F (36.6 °C) (Oral)   Resp 16   Ht 5' 11\" (1.803 m)   Wt 241 lb 6.5 oz (109.5 kg)   SpO2 100%   BMI 33.67 kg/m²      · General appearance: NAD conversant  · Lungs: normal effort, clear to auscultation bilaterally,no wheeze. · Heart: regular rate and rhythm, S1, S2 normal, no murmur  · Abdomen: soft, non-tender; no masses, no organomegaly  · Extremities: no cyanosis, no edema no clubbing no synovitis      Laboratory Testing:  CBC:   Recent Labs      02/01/18   0542   02/01/18   2347   WBC  10.7   --    --    HGB  9.1*   < >  8.5*   PLT  210   --    --     < > = values in this interval not displayed.      BMP:    Recent Labs      01/31/18   0559  02/01/18   0542   NA  144  142   K  4.0  3.6*   CL  110*  106   CO2  24  25   BUN  13  9   CREATININE  0.94  0.95   GLUCOSE  114*  132*         ASSESSMENT:    Patient Active Problem List   Diagnosis    Spasm of muscle    Lumbago    Elevated blood pressure reading without diagnosis of hypertension    Hypertrophy of prostate with urinary obstruction and other lower urinary tract symptoms (LUTS)    Thoracic or lumbosacral neuritis or radiculitis, unspecified    Impotence of organic origin    Hyperlipidemia    Edema    Hypertension    Type 2 diabetes mellitus without complication ([address]    Chronic gout without tophus    GI bleed    Fall down steps       PLAN:  Post polypectomy bleed hemodynamically stable   Monitor h/h  Will follow    Tessa Jeffers MD  [facility]  [id number]

## 2018-02-02 NOTE — CARE COORDINATION
ONGOING DISCHARGE PLAN:    Spoke with patient regarding discharge plan and patient confirms that plan is to return home with no needs. Will have iron transfusion and d/c today    Will continue to follow for additional discharge needs.     Electronically signed by Asuncion Plata RN on 2/2/2018 at 10:58 AM

## 2018-02-05 ENCOUNTER — OFFICE VISIT (OUTPATIENT)
Dept: FAMILY MEDICINE CLINIC | Age: 73
End: 2018-02-05
Payer: MEDICARE

## 2018-02-05 VITALS
HEART RATE: 113 BPM | DIASTOLIC BLOOD PRESSURE: 74 MMHG | TEMPERATURE: 98.4 F | WEIGHT: 235.4 LBS | RESPIRATION RATE: 18 BRPM | OXYGEN SATURATION: 96 % | SYSTOLIC BLOOD PRESSURE: 132 MMHG | BODY MASS INDEX: 33.7 KG/M2 | HEIGHT: 70 IN

## 2018-02-05 DIAGNOSIS — E11.9 TYPE 2 DIABETES MELLITUS WITHOUT COMPLICATION, WITHOUT LONG-TERM CURRENT USE OF INSULIN (HCC): ICD-10-CM

## 2018-02-05 DIAGNOSIS — D50.0 IRON DEFICIENCY ANEMIA DUE TO CHRONIC BLOOD LOSS: Primary | ICD-10-CM

## 2018-02-05 PROCEDURE — G8417 CALC BMI ABV UP PARAM F/U: HCPCS | Performed by: NURSE PRACTITIONER

## 2018-02-05 PROCEDURE — 1123F ACP DISCUSS/DSCN MKR DOCD: CPT | Performed by: NURSE PRACTITIONER

## 2018-02-05 PROCEDURE — 3017F COLORECTAL CA SCREEN DOC REV: CPT | Performed by: NURSE PRACTITIONER

## 2018-02-05 PROCEDURE — 3046F HEMOGLOBIN A1C LEVEL >9.0%: CPT | Performed by: NURSE PRACTITIONER

## 2018-02-05 PROCEDURE — 99214 OFFICE O/P EST MOD 30 MIN: CPT | Performed by: NURSE PRACTITIONER

## 2018-02-05 PROCEDURE — 1111F DSCHRG MED/CURRENT MED MERGE: CPT | Performed by: NURSE PRACTITIONER

## 2018-02-05 PROCEDURE — 1036F TOBACCO NON-USER: CPT | Performed by: NURSE PRACTITIONER

## 2018-02-05 PROCEDURE — G8427 DOCREV CUR MEDS BY ELIG CLIN: HCPCS | Performed by: NURSE PRACTITIONER

## 2018-02-05 PROCEDURE — 4040F PNEUMOC VAC/ADMIN/RCVD: CPT | Performed by: NURSE PRACTITIONER

## 2018-02-05 PROCEDURE — G8484 FLU IMMUNIZE NO ADMIN: HCPCS | Performed by: NURSE PRACTITIONER

## 2018-02-05 ASSESSMENT — PATIENT HEALTH QUESTIONNAIRE - PHQ9
SUM OF ALL RESPONSES TO PHQ9 QUESTIONS 1 & 2: 0
1. LITTLE INTEREST OR PLEASURE IN DOING THINGS: 0
SUM OF ALL RESPONSES TO PHQ QUESTIONS 1-9: 0
2. FEELING DOWN, DEPRESSED OR HOPELESS: 0

## 2018-02-06 ENCOUNTER — TELEPHONE (OUTPATIENT)
Dept: FAMILY MEDICINE CLINIC | Age: 73
End: 2018-02-06

## 2018-02-06 DIAGNOSIS — D62 ACUTE BLOOD LOSS ANEMIA: Primary | ICD-10-CM

## 2018-02-07 ENCOUNTER — HOSPITAL ENCOUNTER (OUTPATIENT)
Age: 73
Discharge: HOME | End: 2018-02-07
Payer: MEDICARE

## 2018-02-07 DIAGNOSIS — D62 ACUTE BLOOD LOSS ANEMIA: ICD-10-CM

## 2018-02-07 LAB
HCT VFR BLD CALC: 30.9 % (ref 41–53)
HEMOGLOBIN: 10.5 G/DL (ref 13.5–17.5)

## 2018-02-07 PROCEDURE — 85014 HEMATOCRIT: CPT

## 2018-02-07 PROCEDURE — 85018 HEMOGLOBIN: CPT

## 2018-02-07 PROCEDURE — 36415 COLL VENOUS BLD VENIPUNCTURE: CPT

## 2018-02-09 ASSESSMENT — ENCOUNTER SYMPTOMS
EYE ITCHING: 0
DIARRHEA: 0
WHEEZING: 0
ABDOMINAL DISTENTION: 0
SINUS PRESSURE: 0
SHORTNESS OF BREATH: 0
COLOR CHANGE: 0
BLOOD IN STOOL: 0
CHEST TIGHTNESS: 0
CONSTIPATION: 0
EYE PAIN: 0
SORE THROAT: 0
COUGH: 0
NAUSEA: 0
ABDOMINAL PAIN: 0

## 2018-02-10 NOTE — PROGRESS NOTES
fall hard on his buttocks 2 days later. States he went to ED because he was bleeding a lot from the rectum. He says was bleeding so badly that he passed out in the ED and then again once getting to the floor and in the bathroom. The bleeding stopped after he got to the floor and went one last time. Says he had his blood checked and his hemoglobin was very low. They tried to get it up for a couple of days and it did not come up so they gave him an IV of iron. Says they checked his hemoglobin and it was up to 9.1. He was then sent home. Was not given any medication at home. Says they told him he did not need anything else. Was not sent home on iron tablets. Says he is feeling better but is still very tired and becomes short of breath if he is very active. Diabetes   He presents for his follow-up diabetic visit. He has type 2 diabetes mellitus. No MedicAlert identification noted. His disease course has been stable. There are no hypoglycemic associated symptoms. Pertinent negatives for hypoglycemia include no headaches, nervousness/anxiousness or speech difficulty. Associated symptoms include fatigue and weakness. Pertinent negatives for diabetes include no chest pain, no polydipsia, no polyphagia and no polyuria. There are no hypoglycemic complications. Symptoms are stable. There are no diabetic complications. Risk factors for coronary artery disease include male sex, diabetes mellitus, hypertension, stress and obesity. Current diabetic treatment includes oral agent (monotherapy) and diet. He is compliant with treatment all of the time. His weight is stable. He is following a generally healthy diet. Meal planning includes avoidance of concentrated sweets. He has not had a previous visit with a dietitian. He participates in exercise intermittently. There is no change in his home blood glucose trend. An ACE inhibitor/angiotensin II receptor blocker is being taken.  He does not see a podiatrist.Eye exam is not current. Hemoglobin A1C (%)   Date Value   07/31/2017 7.0 (H)   04/26/2017 6.8   03/23/2016 5.4             ( goal A1C is < 7)   Microalb/Crt. Ratio (mcg/mg creat)   Date Value   04/26/2017 55 (H)     LDL Cholesterol (mg/dL)   Date Value   07/31/2017 57   07/21/2017 56   03/23/2016 58       (goal LDL is <100)   AST (U/L)   Date Value   01/29/2018 17     ALT (U/L)   Date Value   01/29/2018 22     BUN (mg/dL)   Date Value   02/02/2018 8     BP Readings from Last 3 Encounters:   02/05/18 132/74   02/02/18 (!) 143/84   01/26/18 (!) 142/88          (goal 120/80)    Past Medical History:   Diagnosis Date    Edema     Elevated blood pressure reading without diagnosis of hypertension     Hyperlipidemia     Hypertension     Hypertrophy of prostate with urinary obstruction and other lower urinary tract symptoms (LUTS)     Impotence of organic origin     Lumbago     Retinal detachment 01/12/2018    right eye    Spasm of muscle     Thoracic or lumbosacral neuritis or radiculitis, unspecified     Type II or unspecified type diabetes mellitus without mention of complication, not stated as uncontrolled       Past Surgical History:   Procedure Laterality Date    CARDIAC SURGERY      6 vessel cabg    COLONOSCOPY N/A 1/22/2018    COLONOSCOPY POLYPECTOMY SNARE/COLD BIOPSY performed by Vanessa Arauz MD at [address] [place] [address]      right retina reattachment    PROSTATECTOMY      RETINAL DETACHMENT SURGERY Right     UPPER GASTROINTESTINAL ENDOSCOPY N/A 1/26/2018    EGD ESOPHAGOGASTRODUODENOSCOPY W/BIOPSY performed by Vanessa Arauz MD at [place] [address]         History reviewed. No pertinent family history.     Social History   Substance Use Topics    Smoking status: Passive Smoke Exposure - Never Smoker    Smokeless tobacco: Never Used    Alcohol use 1.2 oz/week     2 Cans of beer per week      Comment: rare      Current Outpatient Prescriptions dry. No rash noted. No erythema. There is pallor. Psychiatric: He has a normal mood and affect. His speech is normal and behavior is normal. Judgment and thought content normal. Cognition and memory are normal.   Nursing note and vitals reviewed. Assessment:      1. Iron deficiency anemia due to chronic blood loss    2. Type 2 diabetes mellitus without complication, without long-term current use of insulin (HCC)               Quality & Risk Score Accuracy - MEDICARE ADVANTAGE    Last edited 02/09/18 21:43 EST by Melanie Patel CNP           Plan:      Iron deficiency anemia:  Go to ED if any shortness of breath at rest, have bloody stools, too dizzy to stand up, or have trouble swallowing because of pain in mouth. Call office if any heartburn, constipation or diarrhea, nausea/vomiting, extreme fatigue,   Take iron supplement as directed  May need to take stool softeners  Eat food rich in iron and protein; such as; nuts, meats, green leafy vegetables, and beans,   Don't drink coffee, tea or other liquids with caffeine. Limit milk to 2 cups a day  Drink plenty of fluids every day. Will recheck H&H now and again in 3 months  Diabetes:  Monitor BS daily or more often if BS's are out of range  Discussed signs and symptoms of hypo/hyperglycemia  Take metformin as directed  Discussed diet, eat balanced meals with a variety of food, limit intake of carbohydrates, low fat, high fiber  Read nutrition labels so appropriate carbohydrates can be counted  Monitor BP every couple of days  Exercise for at least 30 minute a day 3-5 times a week  Drink plenty of water (8-8oz glasses daily)  RTO as directed    No orders of the defined types were placed in this encounter. No orders of the defined types were placed in this encounter. No Follow-up on file. Patient given educational materials - see patient instructions. Discussed use, benefit, and side effects of prescribed medications.   All patient

## 2018-03-02 ENCOUNTER — TELEPHONE (OUTPATIENT)
Dept: FAMILY MEDICINE CLINIC | Age: 73
End: 2018-03-02

## 2018-04-16 DIAGNOSIS — E11.9 TYPE 2 DIABETES MELLITUS WITHOUT COMPLICATION, WITHOUT LONG-TERM CURRENT USE OF INSULIN (HCC): ICD-10-CM

## 2018-04-16 RX ORDER — METOPROLOL SUCCINATE 50 MG/1
50 TABLET, EXTENDED RELEASE ORAL DAILY
Qty: 90 TABLET | Refills: 1 | Status: SHIPPED | OUTPATIENT
Start: 2018-04-16 | End: 2018-10-11 | Stop reason: SDUPTHER

## 2018-04-16 RX ORDER — ALLOPURINOL 300 MG/1
TABLET ORAL
Qty: 90 TABLET | Refills: 1 | Status: SHIPPED | OUTPATIENT
Start: 2018-04-16 | End: 2018-10-22 | Stop reason: SDUPTHER

## 2018-05-09 ENCOUNTER — HOSPITAL ENCOUNTER (OUTPATIENT)
Age: 73
Setting detail: SPECIMEN
Discharge: HOME | End: 2018-05-09
Payer: MEDICARE

## 2018-05-09 LAB
ABSOLUTE EOS #: 0.38 K/UL (ref 0–0.44)
ABSOLUTE IMMATURE GRANULOCYTE: 0.04 K/UL (ref 0–0.3)
ABSOLUTE LYMPH #: 3.02 K/UL (ref 1.1–3.7)
ABSOLUTE MONO #: 1.25 K/UL (ref 0.1–1.2)
BASOPHILS # BLD: 0 % (ref 0–2)
BASOPHILS ABSOLUTE: 0.04 K/UL (ref 0–0.2)
DIFFERENTIAL TYPE: ABNORMAL
EOSINOPHILS RELATIVE PERCENT: 3 % (ref 1–4)
HCT VFR BLD CALC: 44.3 % (ref 40.7–50.3)
HEMOGLOBIN: 14.6 G/DL (ref 13–17)
IMMATURE GRANULOCYTES: 0 %
LYMPHOCYTES # BLD: 25 % (ref 24–43)
MCH RBC QN AUTO: 29.7 PG (ref 25.2–33.5)
MCHC RBC AUTO-ENTMCNC: 33 G/DL (ref 28.4–34.8)
MCV RBC AUTO: 90.2 FL (ref 82.6–102.9)
MONOCYTES # BLD: 10 % (ref 3–12)
NRBC AUTOMATED: 0 PER 100 WBC
PDW BLD-RTO: 14.6 % (ref 11.8–14.4)
PLATELET # BLD: 346 K/UL (ref 138–453)
PLATELET ESTIMATE: ABNORMAL
PMV BLD AUTO: 10.4 FL (ref 8.1–13.5)
RBC # BLD: 4.91 M/UL (ref 4.21–5.77)
RBC # BLD: ABNORMAL 10*6/UL
SEG NEUTROPHILS: 62 % (ref 36–65)
SEGMENTED NEUTROPHILS ABSOLUTE COUNT: 7.57 K/UL (ref 1.5–8.1)
WBC # BLD: 12.3 K/UL (ref 3.5–11.3)
WBC # BLD: ABNORMAL 10*3/UL

## 2018-05-30 ENCOUNTER — OFFICE VISIT (OUTPATIENT)
Dept: FAMILY MEDICINE CLINIC | Age: 73
End: 2018-05-30
Payer: MEDICARE

## 2018-05-30 ENCOUNTER — HOSPITAL ENCOUNTER (OUTPATIENT)
Age: 73
Setting detail: SPECIMEN
Discharge: HOME | End: 2018-05-30
Payer: MEDICARE

## 2018-05-30 VITALS
SYSTOLIC BLOOD PRESSURE: 132 MMHG | WEIGHT: 227 LBS | DIASTOLIC BLOOD PRESSURE: 78 MMHG | BODY MASS INDEX: 32.5 KG/M2 | HEIGHT: 70 IN | OXYGEN SATURATION: 97 % | TEMPERATURE: 98.2 F | HEART RATE: 83 BPM | RESPIRATION RATE: 18 BRPM

## 2018-05-30 DIAGNOSIS — E78.2 MIXED HYPERLIPIDEMIA: ICD-10-CM

## 2018-05-30 DIAGNOSIS — E11.9 TYPE 2 DIABETES MELLITUS WITHOUT COMPLICATION, WITHOUT LONG-TERM CURRENT USE OF INSULIN (HCC): Primary | ICD-10-CM

## 2018-05-30 DIAGNOSIS — I10 ESSENTIAL HYPERTENSION: ICD-10-CM

## 2018-05-30 LAB
CREATININE URINE: 248.5 MG/DL (ref 39–259)
HBA1C MFR BLD: 7.7 %
MICROALBUMIN/CREAT 24H UR: 31 MG/L
MICROALBUMIN/CREAT UR-RTO: 12 MCG/MG CREAT

## 2018-05-30 PROCEDURE — 3045F PR MOST RECENT HEMOGLOBIN A1C LEVEL 7.0-9.0%: CPT | Performed by: NURSE PRACTITIONER

## 2018-05-30 PROCEDURE — 1123F ACP DISCUSS/DSCN MKR DOCD: CPT | Performed by: NURSE PRACTITIONER

## 2018-05-30 PROCEDURE — 99214 OFFICE O/P EST MOD 30 MIN: CPT | Performed by: NURSE PRACTITIONER

## 2018-05-30 PROCEDURE — 2022F DILAT RTA XM EVC RTNOPTHY: CPT | Performed by: NURSE PRACTITIONER

## 2018-05-30 PROCEDURE — 4040F PNEUMOC VAC/ADMIN/RCVD: CPT | Performed by: NURSE PRACTITIONER

## 2018-05-30 PROCEDURE — 3017F COLORECTAL CA SCREEN DOC REV: CPT | Performed by: NURSE PRACTITIONER

## 2018-05-30 PROCEDURE — G8417 CALC BMI ABV UP PARAM F/U: HCPCS | Performed by: NURSE PRACTITIONER

## 2018-05-30 PROCEDURE — 83036 HEMOGLOBIN GLYCOSYLATED A1C: CPT | Performed by: NURSE PRACTITIONER

## 2018-05-30 PROCEDURE — G8427 DOCREV CUR MEDS BY ELIG CLIN: HCPCS | Performed by: NURSE PRACTITIONER

## 2018-05-30 PROCEDURE — 1036F TOBACCO NON-USER: CPT | Performed by: NURSE PRACTITIONER

## 2018-05-30 RX ORDER — VALSARTAN AND HYDROCHLOROTHIAZIDE 80; 12.5 MG/1; MG/1
1 TABLET, FILM COATED ORAL DAILY
Qty: 90 TABLET | Refills: 1 | Status: SHIPPED | OUTPATIENT
Start: 2018-05-30 | End: 2018-12-02

## 2018-05-30 ASSESSMENT — PATIENT HEALTH QUESTIONNAIRE - PHQ9
SUM OF ALL RESPONSES TO PHQ QUESTIONS 1-9: 0
1. LITTLE INTEREST OR PLEASURE IN DOING THINGS: 0
SUM OF ALL RESPONSES TO PHQ9 QUESTIONS 1 & 2: 0
2. FEELING DOWN, DEPRESSED OR HOPELESS: 0

## 2018-06-10 ASSESSMENT — ENCOUNTER SYMPTOMS
COUGH: 0
SHORTNESS OF BREATH: 0
WHEEZING: 0
CONSTIPATION: 0
COLOR CHANGE: 0
ABDOMINAL DISTENTION: 0
SINUS PRESSURE: 0
EYE PAIN: 0
ABDOMINAL PAIN: 0
EYE ITCHING: 0
CHEST TIGHTNESS: 0
BLURRED VISION: 0
BLOOD IN STOOL: 0
NAUSEA: 0
DIARRHEA: 0
SORE THROAT: 0

## 2018-08-09 ENCOUNTER — TELEPHONE (OUTPATIENT)
Dept: FAMILY MEDICINE CLINIC | Age: 73
End: 2018-08-09

## 2018-08-09 DIAGNOSIS — I10 ESSENTIAL HYPERTENSION: Primary | ICD-10-CM

## 2018-08-09 RX ORDER — LOSARTAN POTASSIUM AND HYDROCHLOROTHIAZIDE 25; 100 MG/1; MG/1
1 TABLET ORAL DAILY
Qty: 90 TABLET | Refills: 0 | Status: SHIPPED | OUTPATIENT
Start: 2018-08-09 | End: 2018-10-11 | Stop reason: SDUPTHER

## 2018-08-20 RX ORDER — ROSUVASTATIN CALCIUM 5 MG/1
TABLET, COATED ORAL
Qty: 30 TABLET | Refills: 3 | Status: SHIPPED | OUTPATIENT
Start: 2018-08-20 | End: 2018-11-26 | Stop reason: SDUPTHER

## 2018-10-11 DIAGNOSIS — I10 ESSENTIAL HYPERTENSION: ICD-10-CM

## 2018-10-11 RX ORDER — LOSARTAN POTASSIUM AND HYDROCHLOROTHIAZIDE 25; 100 MG/1; MG/1
1 TABLET ORAL DAILY
Qty: 90 TABLET | Refills: 0 | Status: SHIPPED | OUTPATIENT
Start: 2018-10-11 | End: 2018-11-02 | Stop reason: SDUPTHER

## 2018-10-11 RX ORDER — METOPROLOL SUCCINATE 50 MG/1
50 TABLET, EXTENDED RELEASE ORAL DAILY
Qty: 90 TABLET | Refills: 0 | Status: SHIPPED | OUTPATIENT
Start: 2018-10-11 | End: 2018-11-26 | Stop reason: SDUPTHER

## 2018-10-22 RX ORDER — ALLOPURINOL 300 MG/1
TABLET ORAL
Qty: 90 TABLET | Refills: 0 | Status: SHIPPED | OUTPATIENT
Start: 2018-10-22 | End: 2018-11-26 | Stop reason: SDUPTHER

## 2018-11-02 DIAGNOSIS — I10 ESSENTIAL HYPERTENSION: ICD-10-CM

## 2018-11-04 RX ORDER — LOSARTAN POTASSIUM AND HYDROCHLOROTHIAZIDE 25; 100 MG/1; MG/1
1 TABLET ORAL DAILY
Qty: 90 TABLET | Refills: 0 | Status: SHIPPED | OUTPATIENT
Start: 2018-11-04 | End: 2018-11-26 | Stop reason: SDUPTHER

## 2018-11-26 ENCOUNTER — OFFICE VISIT (OUTPATIENT)
Dept: FAMILY MEDICINE CLINIC | Age: 73
End: 2018-11-26
Payer: MEDICARE

## 2018-11-26 VITALS
DIASTOLIC BLOOD PRESSURE: 76 MMHG | RESPIRATION RATE: 18 BRPM | SYSTOLIC BLOOD PRESSURE: 130 MMHG | BODY MASS INDEX: 33.64 KG/M2 | HEIGHT: 70 IN | HEART RATE: 85 BPM | WEIGHT: 235 LBS | TEMPERATURE: 98 F | OXYGEN SATURATION: 97 %

## 2018-11-26 DIAGNOSIS — E11.9 TYPE 2 DIABETES MELLITUS WITHOUT COMPLICATION, WITHOUT LONG-TERM CURRENT USE OF INSULIN (HCC): ICD-10-CM

## 2018-11-26 DIAGNOSIS — I10 ESSENTIAL HYPERTENSION: ICD-10-CM

## 2018-11-26 PROCEDURE — 1123F ACP DISCUSS/DSCN MKR DOCD: CPT | Performed by: NURSE PRACTITIONER

## 2018-11-26 PROCEDURE — 1036F TOBACCO NON-USER: CPT | Performed by: NURSE PRACTITIONER

## 2018-11-26 PROCEDURE — 2022F DILAT RTA XM EVC RTNOPTHY: CPT | Performed by: NURSE PRACTITIONER

## 2018-11-26 PROCEDURE — 3017F COLORECTAL CA SCREEN DOC REV: CPT | Performed by: NURSE PRACTITIONER

## 2018-11-26 PROCEDURE — G8427 DOCREV CUR MEDS BY ELIG CLIN: HCPCS | Performed by: NURSE PRACTITIONER

## 2018-11-26 PROCEDURE — G8484 FLU IMMUNIZE NO ADMIN: HCPCS | Performed by: NURSE PRACTITIONER

## 2018-11-26 PROCEDURE — 4040F PNEUMOC VAC/ADMIN/RCVD: CPT | Performed by: NURSE PRACTITIONER

## 2018-11-26 PROCEDURE — 99213 OFFICE O/P EST LOW 20 MIN: CPT | Performed by: NURSE PRACTITIONER

## 2018-11-26 PROCEDURE — 1101F PT FALLS ASSESS-DOCD LE1/YR: CPT | Performed by: NURSE PRACTITIONER

## 2018-11-26 PROCEDURE — 3045F PR MOST RECENT HEMOGLOBIN A1C LEVEL 7.0-9.0%: CPT | Performed by: NURSE PRACTITIONER

## 2018-11-26 PROCEDURE — G8417 CALC BMI ABV UP PARAM F/U: HCPCS | Performed by: NURSE PRACTITIONER

## 2018-11-26 RX ORDER — ROSUVASTATIN CALCIUM 5 MG/1
TABLET, COATED ORAL
Qty: 90 TABLET | Refills: 1 | Status: SHIPPED | OUTPATIENT
Start: 2018-11-26

## 2018-11-26 RX ORDER — METOPROLOL SUCCINATE 50 MG/1
50 TABLET, EXTENDED RELEASE ORAL DAILY
Qty: 90 TABLET | Refills: 1 | Status: SHIPPED | OUTPATIENT
Start: 2018-11-26

## 2018-11-26 RX ORDER — LEVETIRACETAM 500 MG/1
500 TABLET ORAL 2 TIMES DAILY
Qty: 180 TABLET | Refills: 1 | Status: SHIPPED | OUTPATIENT
Start: 2018-11-26 | End: 2022-06-29

## 2018-11-26 RX ORDER — ALLOPURINOL 300 MG/1
TABLET ORAL
Qty: 90 TABLET | Refills: 1 | Status: SHIPPED | OUTPATIENT
Start: 2018-11-26

## 2018-11-26 RX ORDER — OMEPRAZOLE 40 MG/1
40 CAPSULE, DELAYED RELEASE ORAL
Qty: 90 CAPSULE | Refills: 1 | Status: SHIPPED | OUTPATIENT
Start: 2018-11-26

## 2018-11-26 RX ORDER — OMEPRAZOLE 20 MG/1
400 CAPSULE, DELAYED RELEASE ORAL DAILY
Qty: 30 CAPSULE | Status: CANCELLED | OUTPATIENT
Start: 2018-11-26

## 2018-11-26 RX ORDER — VALSARTAN AND HYDROCHLOROTHIAZIDE 80; 12.5 MG/1; MG/1
1 TABLET, FILM COATED ORAL DAILY
Qty: 90 TABLET | Refills: 1 | Status: CANCELLED | OUTPATIENT
Start: 2018-11-26

## 2018-11-26 RX ORDER — LOSARTAN POTASSIUM AND HYDROCHLOROTHIAZIDE 25; 100 MG/1; MG/1
1 TABLET ORAL DAILY
Qty: 90 TABLET | Refills: 1 | Status: SHIPPED | OUTPATIENT
Start: 2018-11-26

## 2018-11-26 ASSESSMENT — PATIENT HEALTH QUESTIONNAIRE - PHQ9
1. LITTLE INTEREST OR PLEASURE IN DOING THINGS: 0
SUM OF ALL RESPONSES TO PHQ QUESTIONS 1-9: 0
2. FEELING DOWN, DEPRESSED OR HOPELESS: 0
SUM OF ALL RESPONSES TO PHQ9 QUESTIONS 1 & 2: 0
SUM OF ALL RESPONSES TO PHQ QUESTIONS 1-9: 0

## 2018-11-26 NOTE — PROGRESS NOTES
[address] [place] [address]  Dept: [phone]  Dept Fax: [phone]    Gerardo Coats is a 68 y.o. male who presents today for his medical conditions/complaints as noted below. Gerardo Coats is c/o of Hypertension; Medication Check (bp medication ); and Medication Refill            HPI:     Hypertension   This is a chronic problem. The current episode started more than 1 year ago. The problem has been gradually improving since onset. The problem is controlled. Pertinent negatives include no chest pain, headaches, malaise/fatigue, palpitations, peripheral edema or shortness of breath. There are no associated agents to hypertension. Risk factors for coronary artery disease include diabetes mellitus, dyslipidemia, male gender and obesity. Past treatments include angiotensin blockers, diuretics and beta blockers. The current treatment provides moderate improvement. There are no compliance problems. There is no history of kidney disease, CAD/MI, CVA or heart failure. Diabetes   He presents for his follow-up diabetic visit. He has type 2 diabetes mellitus. No MedicAlert identification noted. His disease course has been stable. There are no hypoglycemic associated symptoms. Pertinent negatives for hypoglycemia include no headaches, nervousness/anxiousness or speech difficulty. Pertinent negatives for diabetes include no chest pain, no foot paresthesias, no polydipsia, no polyphagia, no polyuria and no weakness. There are no hypoglycemic complications. Symptoms are stable. There are no diabetic complications. Pertinent negatives for diabetic complications include no CVA. Risk factors for coronary artery disease include diabetes mellitus, hypertension, male sex and obesity. Current diabetic treatment includes diet and oral agent (monotherapy). He is compliant with treatment most of the time. His weight is stable. He is following a generally healthy diet.  Meal planning includes avoidance of concentrated sweets. He has not had a previous visit with a dietitian. He participates in exercise intermittently. There is no change in his home blood glucose trend. An ACE inhibitor/angiotensin II receptor blocker is being taken. He does not see a podiatrist.Eye exam is current. Hemoglobin A1C (%)   Date Value   05/30/2018 7.7   07/31/2017 7.0 (H)   04/26/2017 6.8             ( goal A1C is <7)   Microalb/Crt.  Ratio (mcg/mg creat)   Date Value   05/30/2018 12     LDL Cholesterol (mg/dL)   Date Value   07/31/2017 57   07/21/2017 56   03/23/2016 58       (goal LDL is <100)   AST (U/L)   Date Value   01/29/2018 17     ALT (U/L)   Date Value   01/29/2018 22     BUN (mg/dL)   Date Value   02/02/2018 8     BP Readings from Last 3 Encounters:   11/26/18 130/76   05/30/18 132/78   02/05/18 132/74          (goal 120/80)    Past Medical History:   Diagnosis Date    Edema     Elevated blood pressure reading without diagnosis of hypertension     Hyperlipidemia     Hypertension     Hypertrophy of prostate with urinary obstruction and other lower urinary tract symptoms (LUTS)     Impotence of organic origin     Lumbago     Retinal detachment 01/12/2018    right eye    Spasm of muscle     Thoracic or lumbosacral neuritis or radiculitis, unspecified     Type II or unspecified type diabetes mellitus without mention of complication, not stated as uncontrolled       Past Surgical History:   Procedure Laterality Date    CARDIAC SURGERY      6 vessel cabg    COLONOSCOPY N/A 1/22/2018    COLONOSCOPY POLYPECTOMY SNARE/COLD BIOPSY performed by Tiffany Wong MD at [facility]      right retina reattachment    PROSTATECTOMY      RETINAL DETACHMENT SURGERY Right     UPPER GASTROINTESTINAL ENDOSCOPY N/A 1/26/2018    EGD ESOPHAGOGASTRODUODENOSCOPY W/BIOPSY performed by Tiffany Wong MD at [address] [facility],[address] EXTRACTION         History

## 2018-12-02 ASSESSMENT — ENCOUNTER SYMPTOMS
CHEST TIGHTNESS: 0
SHORTNESS OF BREATH: 0
WHEEZING: 0
EYE PAIN: 0
DIARRHEA: 0
NAUSEA: 0
SORE THROAT: 0
SINUS PRESSURE: 0
CONSTIPATION: 0
EYE ITCHING: 0
ABDOMINAL DISTENTION: 0
COLOR CHANGE: 0
COUGH: 0
ABDOMINAL PAIN: 0
BLOOD IN STOOL: 0

## 2019-05-01 ENCOUNTER — TELEPHONE (OUTPATIENT)
Dept: PRIMARY CARE CLINIC | Age: 74
End: 2019-05-01

## 2019-05-02 ENCOUNTER — HOSPITAL ENCOUNTER (OUTPATIENT)
Age: 74
Setting detail: SPECIMEN
Discharge: HOME | End: 2019-05-02
Payer: MEDICARE

## 2019-05-02 LAB
ALT SERPL-CCNC: 55 U/L (ref 5–41)
ANION GAP SERPL CALCULATED.3IONS-SCNC: 18 MMOL/L (ref 9–17)
BUN BLDV-MCNC: 31 MG/DL (ref 8–23)
BUN/CREAT BLD: ABNORMAL (ref 9–20)
CALCIUM SERPL-MCNC: 10.3 MG/DL (ref 8.6–10.4)
CHLORIDE BLD-SCNC: 100 MMOL/L (ref 98–107)
CHOLESTEROL, FASTING: 147 MG/DL
CHOLESTEROL/HDL RATIO: 4.3
CO2: 25 MMOL/L (ref 20–31)
CREAT SERPL-MCNC: 1.2 MG/DL (ref 0.7–1.2)
CREATININE URINE: 247 MG/DL (ref 39–259)
GFR AFRICAN AMERICAN: >60 ML/MIN
GFR NON-AFRICAN AMERICAN: 60 ML/MIN
GFR SERPL CREATININE-BSD FRML MDRD: ABNORMAL ML/MIN/{1.73_M2}
GFR SERPL CREATININE-BSD FRML MDRD: ABNORMAL ML/MIN/{1.73_M2}
GLUCOSE FASTING: 148 MG/DL (ref 70–99)
HDLC SERPL-MCNC: 34 MG/DL
LDL CHOLESTEROL: 80 MG/DL (ref 0–130)
MICROALBUMIN/CREAT 24H UR: 34 MG/L
MICROALBUMIN/CREAT UR-RTO: 14 MCG/MG CREAT
POTASSIUM SERPL-SCNC: 4.1 MMOL/L (ref 3.7–5.3)
PROSTATE SPECIFIC ANTIGEN: 0.44 UG/L
SODIUM BLD-SCNC: 143 MMOL/L (ref 135–144)
TRIGLYCERIDE, FASTING: 165 MG/DL
URIC ACID: 5.7 MG/DL (ref 3.4–7)
VLDLC SERPL CALC-MCNC: ABNORMAL MG/DL (ref 1–30)

## 2020-05-19 ENCOUNTER — HOSPITAL ENCOUNTER (OUTPATIENT)
Age: 75
Setting detail: SPECIMEN
Discharge: HOME | End: 2020-05-19
Payer: MEDICARE

## 2020-05-19 LAB
ALT SERPL-CCNC: 15 U/L (ref 5–41)
ANION GAP SERPL CALCULATED.3IONS-SCNC: 13 MMOL/L (ref 9–17)
BUN BLDV-MCNC: 19 MG/DL (ref 8–23)
BUN/CREAT BLD: ABNORMAL (ref 9–20)
CALCIUM SERPL-MCNC: 9.2 MG/DL (ref 8.6–10.4)
CHLORIDE BLD-SCNC: 97 MMOL/L (ref 98–107)
CHOLESTEROL/HDL RATIO: 4.4
CHOLESTEROL: 137 MG/DL
CO2: 27 MMOL/L (ref 20–31)
CREAT SERPL-MCNC: 1.05 MG/DL (ref 0.7–1.2)
CREATININE URINE: 79.5 MG/DL (ref 39–259)
GFR AFRICAN AMERICAN: >60 ML/MIN
GFR NON-AFRICAN AMERICAN: >60 ML/MIN
GFR SERPL CREATININE-BSD FRML MDRD: ABNORMAL ML/MIN/{1.73_M2}
GFR SERPL CREATININE-BSD FRML MDRD: ABNORMAL ML/MIN/{1.73_M2}
GLUCOSE BLD-MCNC: 153 MG/DL (ref 70–99)
HDLC SERPL-MCNC: 31 MG/DL
LDL CHOLESTEROL: 62 MG/DL (ref 0–130)
MICROALBUMIN/CREAT 24H UR: <12 MG/L
MICROALBUMIN/CREAT UR-RTO: NORMAL MCG/MG CREAT
POTASSIUM SERPL-SCNC: 4.2 MMOL/L (ref 3.7–5.3)
SODIUM BLD-SCNC: 137 MMOL/L (ref 135–144)
TRIGL SERPL-MCNC: 222 MG/DL
URIC ACID: 5.5 MG/DL (ref 3.4–7)
VLDLC SERPL CALC-MCNC: ABNORMAL MG/DL (ref 1–30)

## 2021-06-17 ENCOUNTER — HOSPITAL ENCOUNTER (OUTPATIENT)
Age: 76
Setting detail: SPECIMEN
Discharge: HOME | End: 2021-06-17
Payer: MEDICARE

## 2021-06-17 LAB
ALT SERPL-CCNC: 31 U/L (ref 5–41)
ANION GAP SERPL CALCULATED.3IONS-SCNC: 17 MMOL/L (ref 9–17)
BUN BLDV-MCNC: 33 MG/DL (ref 8–23)
BUN/CREAT BLD: ABNORMAL (ref 9–20)
CALCIUM SERPL-MCNC: 10.2 MG/DL (ref 8.6–10.4)
CHLORIDE BLD-SCNC: 98 MMOL/L (ref 98–107)
CHOLESTEROL/HDL RATIO: 4
CHOLESTEROL: 176 MG/DL
CO2: 28 MMOL/L (ref 20–31)
CREAT SERPL-MCNC: 1.37 MG/DL (ref 0.7–1.2)
CREATININE URINE: 212.8 MG/DL (ref 39–259)
GFR AFRICAN AMERICAN: >60 ML/MIN
GFR NON-AFRICAN AMERICAN: 51 ML/MIN
GFR SERPL CREATININE-BSD FRML MDRD: ABNORMAL ML/MIN/{1.73_M2}
GFR SERPL CREATININE-BSD FRML MDRD: ABNORMAL ML/MIN/{1.73_M2}
GLUCOSE BLD-MCNC: 165 MG/DL (ref 70–99)
HDLC SERPL-MCNC: 44 MG/DL
LDL CHOLESTEROL: 88 MG/DL (ref 0–130)
MICROALBUMIN/CREAT 24H UR: 56 MG/L
MICROALBUMIN/CREAT UR-RTO: 26 MCG/MG CREAT
POTASSIUM SERPL-SCNC: 4.1 MMOL/L (ref 3.7–5.3)
PROSTATE SPECIFIC ANTIGEN: 0.37 UG/L
SODIUM BLD-SCNC: 143 MMOL/L (ref 135–144)
TRIGL SERPL-MCNC: 219 MG/DL
VITAMIN D 25-HYDROXY: 37.4 NG/ML (ref 30–100)
VLDLC SERPL CALC-MCNC: ABNORMAL MG/DL (ref 1–30)

## 2021-09-01 ENCOUNTER — HOSPITAL ENCOUNTER (OUTPATIENT)
Dept: SLEEP CENTER | Age: 76
Discharge: HOME | End: 2021-09-03
Payer: MEDICARE

## 2021-09-01 DIAGNOSIS — G47.33 OSA (OBSTRUCTIVE SLEEP APNEA): Primary | ICD-10-CM

## 2021-09-01 PROCEDURE — 95810 POLYSOM 6/> YRS 4/> PARAM: CPT

## 2021-09-02 VITALS
RESPIRATION RATE: 18 BRPM | OXYGEN SATURATION: 94 % | BODY MASS INDEX: 33.62 KG/M2 | HEIGHT: 69 IN | HEART RATE: 90 BPM | WEIGHT: 227 LBS

## 2021-09-02 ASSESSMENT — SLEEP AND FATIGUE QUESTIONNAIRES
HOW LIKELY ARE YOU TO NOD OFF OR FALL ASLEEP IN A CAR, WHILE STOPPED FOR A FEW MINUTES IN TRAFFIC: 0
HOW LIKELY ARE YOU TO NOD OFF OR FALL ASLEEP WHEN YOU ARE A PASSENGER IN A CAR FOR AN HOUR WITHOUT A BREAK: 3
HOW LIKELY ARE YOU TO NOD OFF OR FALL ASLEEP WHILE LYING DOWN TO REST IN THE AFTERNOON WHEN CIRCUMSTANCES PERMIT: 3
HOW LIKELY ARE YOU TO NOD OFF OR FALL ASLEEP WHILE SITTING AND READING: 0
HOW LIKELY ARE YOU TO NOD OFF OR FALL ASLEEP WHILE WATCHING TV: 2
HOW LIKELY ARE YOU TO NOD OFF OR FALL ASLEEP WHILE SITTING INACTIVE IN A PUBLIC PLACE: 0
ESS TOTAL SCORE: 11
HOW LIKELY ARE YOU TO NOD OFF OR FALL ASLEEP WHILE SITTING AND TALKING TO SOMEONE: 0
HOW LIKELY ARE YOU TO NOD OFF OR FALL ASLEEP WHILE SITTING QUIETLY AFTER LUNCH WITHOUT ALCOHOL: 3

## 2021-09-15 LAB — STATUS: NORMAL

## 2021-09-15 PROCEDURE — 95810 POLYSOM 6/> YRS 4/> PARAM: CPT | Performed by: PSYCHIATRY & NEUROLOGY

## 2022-06-09 ENCOUNTER — HOSPITAL ENCOUNTER (OUTPATIENT)
Age: 77
Setting detail: SPECIMEN
Discharge: HOME | End: 2022-06-09

## 2022-06-09 LAB
CREATININE URINE: 147 MG/DL (ref 39–259)
MICROALBUMIN/CREAT 24H UR: 20 MG/L
MICROALBUMIN/CREAT UR-RTO: 14 MCG/MG CREAT

## 2022-06-14 ENCOUNTER — HOSPITAL ENCOUNTER (OUTPATIENT)
Age: 77
Setting detail: SPECIMEN
Discharge: HOME | End: 2022-06-14

## 2022-06-14 LAB
ALT SERPL-CCNC: 13 U/L (ref 5–41)
ANION GAP SERPL CALCULATED.3IONS-SCNC: 17 MMOL/L (ref 9–17)
BUN BLDV-MCNC: 24 MG/DL (ref 8–23)
CALCIUM SERPL-MCNC: 9.7 MG/DL (ref 8.6–10.4)
CHLORIDE BLD-SCNC: 100 MMOL/L (ref 98–107)
CHOLESTEROL/HDL RATIO: 3.1
CHOLESTEROL: 119 MG/DL
CO2: 24 MMOL/L (ref 20–31)
CREAT SERPL-MCNC: 1.16 MG/DL (ref 0.7–1.2)
GFR AFRICAN AMERICAN: >60 ML/MIN
GFR NON-AFRICAN AMERICAN: >60 ML/MIN
GFR SERPL CREATININE-BSD FRML MDRD: ABNORMAL ML/MIN/{1.73_M2}
GLUCOSE BLD-MCNC: 119 MG/DL (ref 70–99)
HDLC SERPL-MCNC: 38 MG/DL
LDL CHOLESTEROL: 61 MG/DL (ref 0–130)
POTASSIUM SERPL-SCNC: 4.1 MMOL/L (ref 3.7–5.3)
SODIUM BLD-SCNC: 141 MMOL/L (ref 135–144)
TRIGL SERPL-MCNC: 98 MG/DL
URIC ACID: 5 MG/DL (ref 3.4–7)
VITAMIN D 25-HYDROXY: 37.5 NG/ML

## 2022-06-29 ENCOUNTER — HOSPITAL ENCOUNTER (OUTPATIENT)
Dept: PREADMISSION TESTING | Age: 77
Discharge: HOME | End: 2022-07-03

## 2022-06-29 VITALS — WEIGHT: 202 LBS | HEIGHT: 70 IN | BODY MASS INDEX: 28.92 KG/M2

## 2022-06-29 RX ORDER — ROPINIROLE 2 MG/1
1 TABLET, FILM COATED, EXTENDED RELEASE ORAL NIGHTLY
COMMUNITY

## 2022-06-29 RX ORDER — CETIRIZINE HYDROCHLORIDE 10 MG/1
10 TABLET ORAL DAILY
COMMUNITY

## 2022-06-29 RX ORDER — ASPIRIN 81 MG/1
81 TABLET ORAL DAILY
COMMUNITY

## 2022-06-29 NOTE — PROGRESS NOTES
stay unit for preparation to be discharged.      INSTRUCTIONS READ TO ALISSA AND UNDERSTANDING VERBALIZED

## 2022-07-07 ENCOUNTER — ANESTHESIA EVENT (OUTPATIENT)
Dept: ENDOSCOPY | Age: 77
End: 2022-07-07
Payer: MEDICARE

## 2022-07-08 ENCOUNTER — HOSPITAL ENCOUNTER (OUTPATIENT)
Age: 77
Setting detail: OUTPATIENT SURGERY
Discharge: HOME | End: 2022-07-08
Attending: SURGERY | Admitting: SURGERY
Payer: MEDICARE

## 2022-07-08 ENCOUNTER — ANESTHESIA (OUTPATIENT)
Dept: ENDOSCOPY | Age: 77
End: 2022-07-08
Payer: MEDICARE

## 2022-07-08 VITALS
SYSTOLIC BLOOD PRESSURE: 102 MMHG | WEIGHT: 202 LBS | OXYGEN SATURATION: 97 % | RESPIRATION RATE: 14 BRPM | BODY MASS INDEX: 28.92 KG/M2 | HEART RATE: 65 BPM | DIASTOLIC BLOOD PRESSURE: 61 MMHG | HEIGHT: 70 IN | TEMPERATURE: 96.9 F

## 2022-07-08 DIAGNOSIS — K57.90 DIVERTICULOSIS: ICD-10-CM

## 2022-07-08 DIAGNOSIS — Z86.010 HISTORY OF COLON POLYPS: ICD-10-CM

## 2022-07-08 LAB
ABSOLUTE EOS #: 0.4 K/UL (ref 0–0.4)
ABSOLUTE LYMPH #: 2.6 K/UL (ref 1–4.8)
ABSOLUTE MONO #: 1.1 K/UL (ref 0.1–1.3)
BASOPHILS # BLD: 0 % (ref 0–2)
BASOPHILS ABSOLUTE: 0 K/UL (ref 0–0.2)
EOSINOPHILS RELATIVE PERCENT: 4 % (ref 0–4)
GLUCOSE BLD-MCNC: 91 MG/DL (ref 75–110)
HCT VFR BLD CALC: 42 % (ref 41–53)
HEMOGLOBIN: 14.3 G/DL (ref 13.5–17.5)
LYMPHOCYTES # BLD: 25 % (ref 24–44)
MCH RBC QN AUTO: 32 PG (ref 26–34)
MCHC RBC AUTO-ENTMCNC: 34.2 G/DL (ref 31–37)
MCV RBC AUTO: 93.7 FL (ref 80–100)
MONOCYTES # BLD: 11 % (ref 1–7)
PDW BLD-RTO: 14.7 % (ref 11.5–14.9)
PLATELET # BLD: 296 K/UL (ref 150–450)
PMV BLD AUTO: 7.9 FL (ref 6–12)
RBC # BLD: 4.48 M/UL (ref 4.5–5.9)
SEG NEUTROPHILS: 60 % (ref 36–66)
SEGMENTED NEUTROPHILS ABSOLUTE COUNT: 6.1 K/UL (ref 1.3–9.1)
WBC # BLD: 10.3 K/UL (ref 3.5–11)

## 2022-07-08 PROCEDURE — 6360000002 HC RX W HCPCS: Performed by: NURSE ANESTHETIST, CERTIFIED REGISTERED

## 2022-07-08 PROCEDURE — 7100000010 HC PHASE II RECOVERY - FIRST 15 MIN: Performed by: SURGERY

## 2022-07-08 PROCEDURE — 82947 ASSAY GLUCOSE BLOOD QUANT: CPT

## 2022-07-08 PROCEDURE — 2500000003 HC RX 250 WO HCPCS: Performed by: NURSE ANESTHETIST, CERTIFIED REGISTERED

## 2022-07-08 PROCEDURE — 7100000001 HC PACU RECOVERY - ADDTL 15 MIN: Performed by: SURGERY

## 2022-07-08 PROCEDURE — 2709999900 HC NON-CHARGEABLE SUPPLY: Performed by: SURGERY

## 2022-07-08 PROCEDURE — 3609010300 HC COLONOSCOPY W/BIOPSY SINGLE/MULTIPLE: Performed by: SURGERY

## 2022-07-08 PROCEDURE — 2580000003 HC RX 258: Performed by: ANESTHESIOLOGY

## 2022-07-08 PROCEDURE — 3700000001 HC ADD 15 MINUTES (ANESTHESIA): Performed by: SURGERY

## 2022-07-08 PROCEDURE — 88305 TISSUE EXAM BY PATHOLOGIST: CPT

## 2022-07-08 PROCEDURE — 85025 COMPLETE CBC W/AUTO DIFF WBC: CPT

## 2022-07-08 PROCEDURE — 7100000030 HC ASPR PHASE II RECOVERY - FIRST 15 MIN: Performed by: SURGERY

## 2022-07-08 PROCEDURE — 3700000000 HC ANESTHESIA ATTENDED CARE: Performed by: SURGERY

## 2022-07-08 PROCEDURE — 7100000011 HC PHASE II RECOVERY - ADDTL 15 MIN: Performed by: SURGERY

## 2022-07-08 PROCEDURE — 7100000031 HC ASPR PHASE II RECOVERY - ADDTL 15 MIN: Performed by: SURGERY

## 2022-07-08 PROCEDURE — 7100000000 HC PACU RECOVERY - FIRST 15 MIN: Performed by: SURGERY

## 2022-07-08 PROCEDURE — 36415 COLL VENOUS BLD VENIPUNCTURE: CPT

## 2022-07-08 RX ORDER — M-VIT,TX,IRON,MINS/CALC/FOLIC 27MG-0.4MG
1 TABLET ORAL DAILY
COMMUNITY

## 2022-07-08 RX ORDER — PHENYLEPHRINE HYDROCHLORIDE 10 MG/ML
INJECTION INTRAVENOUS PRN
Status: DISCONTINUED | OUTPATIENT
Start: 2022-07-08 | End: 2022-07-08 | Stop reason: SDUPTHER

## 2022-07-08 RX ORDER — PROPOFOL 10 MG/ML
INJECTION, EMULSION INTRAVENOUS PRN
Status: DISCONTINUED | OUTPATIENT
Start: 2022-07-08 | End: 2022-07-08 | Stop reason: SDUPTHER

## 2022-07-08 RX ORDER — LIDOCAINE HYDROCHLORIDE 10 MG/ML
1 INJECTION, SOLUTION EPIDURAL; INFILTRATION; INTRACAUDAL; PERINEURAL
Status: DISCONTINUED | OUTPATIENT
Start: 2022-07-08 | End: 2022-07-08 | Stop reason: HOSPADM

## 2022-07-08 RX ORDER — LIDOCAINE HYDROCHLORIDE 20 MG/ML
INJECTION, SOLUTION EPIDURAL; INFILTRATION; INTRACAUDAL; PERINEURAL PRN
Status: DISCONTINUED | OUTPATIENT
Start: 2022-07-08 | End: 2022-07-08 | Stop reason: SDUPTHER

## 2022-07-08 RX ORDER — SODIUM CHLORIDE 9 MG/ML
INJECTION, SOLUTION INTRAVENOUS CONTINUOUS
Status: DISCONTINUED | OUTPATIENT
Start: 2022-07-08 | End: 2022-07-08 | Stop reason: HOSPADM

## 2022-07-08 RX ADMIN — PHENYLEPHRINE HYDROCHLORIDE 100 MCG: 10 INJECTION INTRAVENOUS at 07:14

## 2022-07-08 RX ADMIN — SODIUM CHLORIDE: 9 INJECTION, SOLUTION INTRAVENOUS at 06:23

## 2022-07-08 RX ADMIN — PROPOFOL 150 MG: 10 INJECTION, EMULSION INTRAVENOUS at 07:07

## 2022-07-08 RX ADMIN — PHENYLEPHRINE HYDROCHLORIDE 100 MCG: 10 INJECTION INTRAVENOUS at 07:29

## 2022-07-08 RX ADMIN — PHENYLEPHRINE HYDROCHLORIDE 100 MCG: 10 INJECTION INTRAVENOUS at 07:12

## 2022-07-08 RX ADMIN — LIDOCAINE HYDROCHLORIDE 60 MG: 20 INJECTION, SOLUTION EPIDURAL; INFILTRATION; INTRACAUDAL; PERINEURAL at 07:07

## 2022-07-08 ASSESSMENT — PAIN - FUNCTIONAL ASSESSMENT: PAIN_FUNCTIONAL_ASSESSMENT: 0-10

## 2022-07-08 NOTE — ANESTHESIA PRE PROCEDURE
Department of Anesthesiology  Preprocedure Note       Name:  Pearl Langston   Age:  77 y.o.  :  1945                                          MRN:  [id number]         Date:  2022      Surgeon: Lonna Frankel):  Edilia Matamoros MD    Procedure: Procedure(s):  COLONOSCOPY DIAGNOSTIC    Medications prior to admission:   Prior to Admission medications    Medication Sig Start Date End Date Taking?  Authorizing Provider   Multiple Vitamins-Minerals (THERAPEUTIC MULTIVITAMIN-MINERALS) tablet Take 1 tablet by mouth daily   Yes Historical Provider, MD   aspirin 81 MG EC tablet Take 81 mg by mouth daily    Historical Provider, MD   cetirizine (ZYRTEC) 10 MG tablet Take 10 mg by mouth daily    Historical Provider, MD   rOPINIRole (REQUIP XL) 2 MG extended release tablet Take 1 mg by mouth nightly FOR RLS    Historical Provider, MD   allopurinol (ZYLOPRIM) 300 MG tablet TAKE ONE TABLET BY MOUTH ONCE DAILY 18   Salvatore Oliver   losartan-hydrochlorothiazide [facility]) 100-25 MG per tablet Take 1 tablet by mouth daily 18   Efrem Sharma   metFORMIN (GLUCOPHAGE) 500 MG tablet Take 1 tablet by mouth 2 times daily (with meals) 18   Efrem Sharma   metoprolol succinate (TOPROL XL) 50 MG extended release tablet Take 1 tablet by mouth daily 18   Salvatore Oliver   rosuvastatin (CRESTOR) 5 MG tablet TAKE ONE TABLET BY MOUTH EVERY 48 HOURS 18   Salvatore Oliver   omeprazole (PRILOSEC) 40 MG delayed release capsule Take 1 capsule by mouth every morning (before breakfast) 18   Efrem Sharma       Current medications:    Current Facility-Administered Medications   Medication Dose Route Frequency Provider Last Rate Last Admin    0.9 % sodium chloride infusion   IntraVENous Continuous Hyacinth Dougherty  mL/hr at 22 0623 New Bag at 22 0623    lidocaine PF 1 % injection 1 mL  1 mL IntraDERmal Once PRN Hyacinth Dougherty MD           Allergies:  No Known Allergies    Problem List:    Patient Active Problem WISDOM TOOTH EXTRACTION         Social History:    Social History     Tobacco Use    Smoking status: Passive Smoke Exposure - Never Smoker    Smokeless tobacco: Never Used   Substance Use Topics    Alcohol use: Yes     Alcohol/week: 2.0 standard drinks     Types: 2 Cans of beer per week     Comment: rare                                Counseling given: Not Answered      Vital Signs (Current):   Vitals:    07/08/22 0601   BP: 119/73   Pulse: 80   Resp: 16   Temp: 97.1 °F (36.2 °C)   TempSrc: Infrared   SpO2: 97%   Weight: 202 lb (91.6 kg)   Height: 5' 10\" (1.778 m)                                              BP Readings from Last 3 Encounters:   07/08/22 119/73   11/26/18 130/76   05/30/18 132/78       NPO Status: Time of last liquid consumption: 2100                        Time of last solid consumption: 1900                        Date of last liquid consumption: 07/07/22                        Date of last solid food consumption: 07/06/22    BMI:   Wt Readings from Last 3 Encounters:   07/08/22 202 lb (91.6 kg)   06/29/22 202 lb (91.6 kg)   09/02/21 227 lb (103 kg)     Body mass index is 28.98 kg/m².     CBC:   Lab Results   Component Value Date/Time    WBC 10.3 07/08/2022 06:21 AM    RBC 4.48 07/08/2022 06:21 AM    HGB 14.3 07/08/2022 06:21 AM    HCT 42.0 07/08/2022 06:21 AM    MCV 93.7 07/08/2022 06:21 AM    RDW 14.7 07/08/2022 06:21 AM     07/08/2022 06:21 AM       CMP:   Lab Results   Component Value Date/Time     06/14/2022 11:45 AM    K 4.1 06/14/2022 11:45 AM     06/14/2022 11:45 AM    CO2 24 06/14/2022 11:45 AM    BUN 24 06/14/2022 11:45 AM    CREATININE 1.16 06/14/2022 11:45 AM    GFRAA >60 06/14/2022 11:45 AM    GFRAA >60 06/06/2010 06:35 PM    AGRATIO 1.5 06/06/2010 06:35 PM    LABGLOM >60 06/14/2022 11:45 AM    GLUCOSE 119 06/14/2022 11:45 AM    PROT 7.3 01/29/2018 08:02 PM    PROT 7.3 06/06/2010 06:35 PM    CALCIUM 9.7 06/14/2022 11:45 AM    BILITOT 0.37 01/29/2018 08:02 PM ALKPHOS 51 01/29/2018 08:02 PM    AST 17 01/29/2018 08:02 PM    ALT 13 06/14/2022 11:45 AM       POC Tests:   Recent Labs     07/08/22  0621   POCGLU 91       Coags:   Lab Results   Component Value Date/Time    PROTIME 11.4 01/30/2018 05:21 AM    INR 1.1 01/30/2018 05:21 AM    APTT 25.2 01/29/2018 08:02 PM       HCG (If Applicable): No results found for: PREGTESTUR, PREGSERUM, HCG, HCGQUANT     ABGs: No results found for: PHART, PO2ART, KUD5LWM, KES7PMV, BEART, X1HIXRZF     Type & Screen (If Applicable):  No results found for: LABABO, LABRH    Drug/Infectious Status (If Applicable):  No results found for: HIV, HEPCAB    COVID-19 Screening (If Applicable): No results found for: COVID19        Anesthesia Evaluation  Patient summary reviewed and Nursing notes reviewed no history of anesthetic complications:   Airway: Mallampati: III  TM distance: >3 FB   Neck ROM: full  Mouth opening: > = 3 FB   Dental: normal exam         Pulmonary:normal exam  breath sounds clear to auscultation  (+) sleep apnea (HX. OF HAD SURGERY FOR THIS):                             Cardiovascular:    (+) hypertension:, CAD:, CABG/stent (CABG):, dysrhythmias (hx PVC's, PAF): atrial fibrillation, hyperlipidemia      ECG reviewed  Rhythm: regular  Rate: normal           Beta Blocker:  Dose within 24 Hrs         Neuro/Psych:   (+) neuromuscular disease:,             GI/Hepatic/Renal:   (+) GERD:, renal disease (BPH):,           Endo/Other:    (+) DiabetesType II DM, , .                  ROS comment: Gout Abdominal:             Vascular: negative vascular ROS. Other Findings:           Anesthesia Plan      general     ASA 3       Induction: intravenous. MIPS: Prophylactic antiemetics administered. Anesthetic plan and risks discussed with patient. Plan discussed with CRNA.                     Brooke Guzman MD   7/8/2022

## 2022-07-08 NOTE — OP NOTE
Operative Note      Patient: Anitha Rivera  YOB: 1945  MRN: [id number]    Date of Procedure: 7/8/2022    PROCEDURE NOTE    DATE OF PROCEDURE: 7/8/2022    SURGEON: Yang Richards MD    ASSISTANT: None    PREOPERATIVE DIAGNOSIS: History of colon polyp    POSTOPERATIVE DIAGNOSIS: Grade 1 through 2 internal hemorrhoid. Rectal polyp. Sigmoid diverticulosis. Left colon polyp. Ascending colon polyp. OPERATION: Total colonoscopy to cecum with multiple polypectomies with large cold biopsy forceps    ANESTHESIA: General    ESTIMATED BLOOD LOSS: None    COMPLICATIONS: None     SPECIMENS:  Was Obtained:     HISTORY: The patient is a 77y.o. year old male with history of above preop diagnosis. I recommended colonoscopy with possible biopsy or polypectomy and I explained the risk, benefits, expected outcome, and alternatives to the procedure. Risks included but are not limited to bleeding, infection, respiratory distress, hypotension, and perforation of the colon and possibility of missing a lesion. The patient understands and is in agreement. PROCEDURE: The patient was given IV conscious sedation. The patient's SPO2 remained above 90% throughout the procedure. Digital rectal exam was normal.  The colonoscope was inserted through the anus into the rectum and advanced under direct vision to the cecum without difficulty. Terminal ileum was examined for approximately 2 inches. The prep was fair. Findings:    Cecum/Ascending colon: abnormal: Ascending colon polyp removed with large cold biopsy forceps    Transverse colon: normal    Descending/Sigmoid colon: abnormal: Left colon polyp removed with large cold biopsy forceps. Sigmoid diverticulosis. Rectum/Anus: examined in normal and retroflexed positions and was abnormal: Rectal polyp removed with large cold biopsy forceps. Grade 1-2 internal hemorrhoids. Withdrawal Time was (minutes): 25      Next screening colonoscopy: 3 years.   If screening is less than 10 years the recommended reason is due: Polyps    The colon was decompressed. While withdrawing the scope the above findings were verified and the scope was removed. The patient tolerated the procedure and conscious sedation without unusual events. In the recovery room patient was examined and remains hemodynamically stable. Discharge home when criteria met. Recommendations/Plan:   1. F/U Biopsies  2. F/U In Office as instructed  3. Discussed with the family  4. High fiber diet   5.  Precautions to avoid constipation    Electronically signed by Susie Calvillo MD  on 7/8/2022 at 7:46 AM

## 2022-07-08 NOTE — H&P
HISTORY and [facility]       NAME:  Roberto Yung  MRN: [id number]   YOB: 1945   Date: 7/8/2022   Age: 77 y.o. Gender: male       COMPLAINT AND PRESENT HISTORY:   Roberto Yung is 77 y.o.,   male, having a Diagnostic Colonoscopy, for hx of: History of colon polyps and Diverticulosis. Prior Colonoscopy was done in 2018 with polyp removed. Patient has hx of Colon Polyps. Pt also has hx of Diverticulosis. Pt denies any flare ups. Patient denies any  FH of Colon Cancer. Patient reports changes in bowel habits. Patient states that his regularity is not as good as he ages. No difficulty in bowel movements. Pt denies any GI /Rectal bleeding. Denies any melena stools. Pt admits to some gas. Patient admits to some heartburn with certain foods and takes prilosec. Patient denies any nausea / vomiting. No diarrhea or constipation. No abdominal pains or cramping. No dysphagia. no changes in the color, caliber or  consistency of the stools. Any significant  medical conditions:   HTN, HLD, DM, CAD- s/p CABG x6 vessels  Denies current chest pain, SOB. No recent URI, fever or chills. Any anticoagulants or blood thinners: aspirin    Patient has been NPO since midnight. No blood thinners in the past  5-7 days. Patient states has taken all bowel prep with clear outcome. Patient denies any personal or family problems with anesthesia. PAST MEDICAL HISTORY     Past Medical History:   Diagnosis Date    Edema     Elevated blood pressure reading without diagnosis of hypertension     Hyperlipidemia     Hypertension     Hypertrophy of prostate with urinary obstruction and other lower urinary tract symptoms (LUTS)     Impotence of organic origin     Lumbago     Retinal detachment 01/12/2018    right eye    Sleep apnea     HX.  OF HAD SURGERY FOR     Spasm of muscle     Thoracic or lumbosacral neuritis or radiculitis, unspecified     Type II or unspecified type diabetes mellitus without mention of complication, not stated as uncontrolled        SURGICAL HISTORY       Past Surgical History:   Procedure Laterality Date    CARDIAC SURGERY      6 vessel cabg    CATARACT REMOVAL Bilateral     COLONOSCOPY N/A 1/22/2018    COLONOSCOPY POLYPECTOMY SNARE/COLD BIOPSY performed by Julien Valdez MD at [address]      right retina reattachment    PROSTATECTOMY      RETINAL DETACHMENT SURGERY Right     UPPER GASTROINTESTINAL ENDOSCOPY N/A 1/26/2018    EGD ESOPHAGOGASTRODUODENOSCOPY W/BIOPSY performed by Julien Valdez MD at [address] [facility]     No family history on file. SOCIAL HISTORY       Social History     Socioeconomic History    Marital status:      Spouse name: None    Number of children: 2    Years of education: None    Highest education level: None   Occupational History    None   Tobacco Use    Smoking status: Passive Smoke Exposure - Never Smoker    Smokeless tobacco: Never Used   Vaping Use    Vaping Use: Never used   Substance and Sexual Activity    Alcohol use: Yes     Alcohol/week: 2.0 standard drinks     Types: 2 Cans of beer per week     Comment: rare    Drug use: No    Sexual activity: Never   Other Topics Concern    None   Social History Narrative    None     Social Determinants of Health     Financial Resource Strain:     Difficulty of Paying Living Expenses: Not on file   Food Insecurity:     Worried About Running Out of Food in the Last Year: Not on file    Thalia of Food in the Last Year: Not on file   Transportation Needs:     Lack of Transportation (Medical): Not on file    Lack of Transportation (Non-Medical):  Not on file   Physical Activity:     Days of Exercise per Week: Not on file    Minutes of Exercise per Session: Not on file   Stress:     Feeling of Stress : Not on file   Social Connections:     Frequency of Communication with Friends and Family: Not on file    Frequency of Social Gatherings with Friends and Family: Not on file    Attends [employer]ism Services: Not on file    Active Member of Clubs or Organizations: Not on file    Attends Club or Organization Meetings: Not on file    Marital Status: Not on file   Intimate Partner Violence:     Fear of Current or Ex-Partner: Not on file    Emotionally Abused: Not on file    Physically Abused: Not on file    Sexually Abused: Not on file   Housing Stability:     Unable to Pay for Housing in the Last Year: Not on file    Number of [place] in the Last Year: Not on file    Unstable Housing in the Last Year: Not on file           REVIEW OF SYSTEMS      No Known Allergies    No current facility-administered medications on file prior to encounter. Current Outpatient Medications on File Prior to Encounter   Medication Sig Dispense Refill    allopurinol (ZYLOPRIM) 300 MG tablet TAKE ONE TABLET BY MOUTH ONCE DAILY 90 tablet 1    losartan-hydrochlorothiazide (HYZAAR) 100-25 MG per tablet Take 1 tablet by mouth daily 90 tablet 1    metFORMIN (GLUCOPHAGE) 500 MG tablet Take 1 tablet by mouth 2 times daily (with meals) 180 tablet 1    metoprolol succinate (TOPROL XL) 50 MG extended release tablet Take 1 tablet by mouth daily 90 tablet 1    rosuvastatin (CRESTOR) 5 MG tablet TAKE ONE TABLET BY MOUTH EVERY 48 HOURS 90 tablet 1    omeprazole (PRILOSEC) 40 MG delayed release capsule Take 1 capsule by mouth every morning (before breakfast) 90 capsule 1       Negative except for what is mentioned in the HPI. GENERAL PHYSICAL EXAM     Vitals :   See vital signs in RN flow sheet. GENERAL APPEARANCE:   Vero Zuleta is 77 y.o., male, mildly obese, nourished, conscious, alert. Does not appear to be distress or pain at this time. SKIN:  Warm, dry, no cyanosis or jaundice.               HEAD:  Normocephalic, atraumatic, no swelling or tenderness. EYES:  Pupils equal, reactive to light. EARS:  No discharge, no marked hearing loss. NOSE:  No rhinorrhea, epistaxis or septal deformity. THROAT:  Not congested. No ulceration bleeding or discharge. NECK:  No stiffness, trachea central.  No palpable masses or L.N.                 CHEST:  Symmetrical and equal on expansion. HEART:   Heart rhythm irreg. . No audible murmurs or gallops. LUNGS:  Equal on expansion, normal breath sounds. No adventitious sounds. ABDOMEN:  Obese. Soft on palpation. No dysphagia, No localized tenderness. No guarding or rigidity. LYMPHATICS:  No palpable cervical lymphadenopathy. LOCOMOTOR, BACK AND SPINE:  No tenderness or deformities. EXTREMITIES:  Symmetrical, no pretibial edema. No discoloration or ulcerations. NEUROLOGIC:  The patient is conscious, alert, oriented,Cranial nerve II-XII intact, taste and smell were not examined. No apparent focal sensory or motor deficits.              PROVISIONAL DIAGNOSES / SURGERY:      COLONOSCOPY DIAGNOSTIC    History of colon polyps     Diverticulosis     Patient Active Problem List    Diagnosis Date Noted    FISH (obstructive sleep apnea)     Acute blood loss anemia     Fall down steps 01/30/2018    Gastrointestinal hemorrhage with melena 01/29/2018    Chronic gout without tophus 02/02/2016    Type 2 diabetes mellitus without complication ([facility] [address] 19/33/5394    Spasm of muscle     Lumbago     Elevated blood pressure reading without diagnosis of hypertension     Hypertrophy of prostate with urinary obstruction and other lower urinary tract symptoms (LUTS)     Thoracic or lumbosacral neuritis or radiculitis, unspecified     Impotence of organic origin     Hyperlipidemia     Edema     Hypertension            ANGIE JUAN, TOPHER - CNP on 7/8/2022 at 5:44 AM

## 2022-07-11 LAB — SURGICAL PATHOLOGY REPORT: NORMAL

## 2023-06-26 ENCOUNTER — HOSPITAL ENCOUNTER (OUTPATIENT)
Age: 78
Setting detail: SPECIMEN
Discharge: HOME | End: 2023-06-26

## 2023-06-26 LAB
CREAT UR-MCNC: 259.4 MG/DL (ref 39–259)
MICROALBUMIN UR-MCNC: 25 MG/L
MICROALBUMIN/CREAT UR-RTO: 10 MCG/MG CREAT

## 2024-01-18 LAB
ALBUMIN URINE, EXTERNAL: 15.5
CREATININE, URINE, EXTERNAL: 146.96
MICROALBUMIN/CREAT UR: 105.5 MG/G{CREAT}

## 2024-02-06 PROBLEM — M54.14 THORACIC AND LUMBOSACRAL NEURITIS: Status: ACTIVE | Noted: 2017-07-27

## 2024-02-06 PROBLEM — E66.811 CLASS 1 OBESITY IN ADULT: Status: ACTIVE | Noted: 2022-12-16

## 2024-02-06 PROBLEM — R44.1 VISUAL HALLUCINATIONS: Status: ACTIVE | Noted: 2024-02-06

## 2024-02-06 PROBLEM — N18.9 ACUTE KIDNEY INJURY SUPERIMPOSED ON CKD (HCC): Status: ACTIVE | Noted: 2023-06-02

## 2024-02-06 PROBLEM — R06.02 SOB (SHORTNESS OF BREATH) ON EXERTION: Status: ACTIVE | Noted: 2017-07-27

## 2024-02-06 PROBLEM — I34.0 NONRHEUMATIC MITRAL VALVE REGURGITATION: Status: ACTIVE | Noted: 2022-12-16

## 2024-02-06 PROBLEM — N17.9 ACUTE KIDNEY INJURY SUPERIMPOSED ON CKD (HCC): Status: ACTIVE | Noted: 2023-06-02

## 2024-02-06 PROBLEM — R60.9 EDEMA: Status: ACTIVE | Noted: 2017-07-27

## 2024-02-06 PROBLEM — H25.12 AGE-RELATED NUCLEAR CATARACT OF LEFT EYE: Status: ACTIVE | Noted: 2024-02-06

## 2024-02-06 PROBLEM — N52.9 IMPOTENCE OF ORGANIC ORIGIN: Status: ACTIVE | Noted: 2017-07-27

## 2024-02-06 PROBLEM — Z96.1 PRESENCE OF INTRAOCULAR LENS: Status: ACTIVE | Noted: 2024-02-06

## 2024-02-06 PROBLEM — H91.93 HEARING DIFFICULTY OF BOTH EARS: Status: ACTIVE | Noted: 2023-10-10

## 2024-02-06 PROBLEM — M54.17 THORACIC AND LUMBOSACRAL NEURITIS: Status: ACTIVE | Noted: 2017-07-27

## 2024-02-06 PROBLEM — N40.1 BENIGN PROSTATIC HYPERPLASIA WITH URINARY OBSTRUCTION: Status: ACTIVE | Noted: 2017-07-27

## 2024-02-06 PROBLEM — E78.5 HYPERLIPIDEMIA: Status: ACTIVE | Noted: 2017-07-27

## 2024-02-06 PROBLEM — I25.10 CORONARY ARTERY DISEASE INVOLVING NATIVE CORONARY ARTERY OF NATIVE HEART WITHOUT ANGINA PECTORIS: Status: ACTIVE | Noted: 2018-01-04

## 2024-02-06 PROBLEM — I48.19 PERSISTENT ATRIAL FIBRILLATION (HCC): Status: ACTIVE | Noted: 2022-08-22

## 2024-02-06 PROBLEM — E83.42 HYPOMAGNESEMIA: Status: ACTIVE | Noted: 2023-06-03

## 2024-02-06 PROBLEM — N13.8 BENIGN PROSTATIC HYPERPLASIA WITH URINARY OBSTRUCTION: Status: ACTIVE | Noted: 2017-07-27

## 2024-02-06 PROBLEM — Z95.1 HISTORY OF CORONARY ARTERY BYPASS GRAFT X 6: Status: ACTIVE | Noted: 2018-01-04

## 2024-02-06 PROBLEM — R25.2 SPASM: Status: ACTIVE | Noted: 2017-07-27

## 2024-02-06 PROBLEM — L30.9 DERMATITIS, ECZEMATOID: Status: ACTIVE | Noted: 2023-10-10

## 2024-02-06 PROBLEM — M54.50 LOW BACK PAIN: Status: ACTIVE | Noted: 2017-07-27

## 2024-02-06 PROBLEM — R55 SYNCOPE AND COLLAPSE: Status: ACTIVE | Noted: 2023-07-12

## 2024-02-06 PROBLEM — H43.813 VITREOUS DEGENERATION OF BOTH EYES: Status: ACTIVE | Noted: 2024-02-06

## 2024-02-06 PROBLEM — I50.33 ACUTE ON CHRONIC DIASTOLIC HEART FAILURE (HCC): Status: ACTIVE | Noted: 2024-01-25

## 2024-02-06 PROBLEM — I10 HYPERTENSION: Status: ACTIVE | Noted: 2017-07-27

## 2024-02-06 PROBLEM — H52.4 PRESBYOPIA: Status: ACTIVE | Noted: 2024-02-06

## 2024-02-06 PROBLEM — J31.0 RHINITIS: Status: ACTIVE | Noted: 2023-07-10

## 2024-02-06 PROBLEM — E86.0 ACUTE DEHYDRATION: Status: ACTIVE | Noted: 2023-06-03

## 2024-02-28 PROBLEM — R94.39 ABNORMAL STRESS TEST: Status: ACTIVE | Noted: 2024-02-20

## 2024-02-28 PROBLEM — K21.9 GASTROESOPHAGEAL REFLUX DISEASE: Status: ACTIVE | Noted: 2023-06-07

## 2024-02-28 PROBLEM — R79.89 HIGH SERUM CREATININE: Status: ACTIVE | Noted: 2024-02-20

## 2024-02-28 PROBLEM — M79.89 SYMPTOM OF LEG SWELLING: Status: ACTIVE | Noted: 2024-01-16

## 2024-02-28 PROBLEM — N28.9 RENAL INSUFFICIENCY: Status: ACTIVE | Noted: 2024-02-28

## 2024-02-28 PROBLEM — G47.9 SLEEP DISTURBANCE: Status: ACTIVE | Noted: 2023-06-07

## 2024-02-28 PROBLEM — N52.9 MALE ERECTILE DYSFUNCTION, UNSPECIFIED: Status: ACTIVE | Noted: 2017-07-27

## 2024-02-28 PROBLEM — F51.4 NIGHT TERRORS, ADULT: Status: ACTIVE | Noted: 2023-06-07

## 2024-02-28 PROBLEM — I49.9 IRREGULARLY IRREGULAR PULSE RHYTHM: Status: ACTIVE | Noted: 2023-06-07

## 2024-02-28 PROBLEM — R40.1 CLOUDED CONSCIOUSNESS: Status: ACTIVE | Noted: 2024-01-16

## 2024-02-28 PROBLEM — M79.2 NEURALGIA AND NEURITIS, UNSPECIFIED: Status: ACTIVE | Noted: 2024-02-05

## (undated) DEVICE — AIRLIFE™ NASAL OXYGEN CANNULA CURVED, FLARED TIP, WITH 7 FEET (2.1 M) CRUSH RESISTANT TUBING, OVER-THE-EAR STYLE: Brand: AIRLIFE™

## (undated) DEVICE — DUP USE 393023 JELLY LUBRICATING EZ 2OZ

## (undated) DEVICE — FORCEPS BX L240CM JAW DIA2.4MM ORNG L CAP W/ NDL DISP RAD

## (undated) DEVICE — DEFENDO AIR WATER SUCTION AND BIOPSY VALVE KIT FOR  OLYMPUS: Brand: DEFENDO AIR/WATER/SUCTION AND BIOPSY VALVE

## (undated) DEVICE — GLOVE ORANGE PI 7 1/2   MSG9075

## (undated) DEVICE — BITEBLOCK 54FR W/ DENT RIM BLOX

## (undated) DEVICE — SNARE ENDOSCP L240CM LOOP W13MM DIA2.4MM SHT THROW SM OVL

## (undated) DEVICE — FORCEPS BX L240CM WRK CHN 2.8MM STD CAP W/ NDL MIC MESH

## (undated) DEVICE — ENDO KIT W/SYRINGE: Brand: MEDLINE INDUSTRIES, INC.